# Patient Record
Sex: FEMALE | Race: WHITE | NOT HISPANIC OR LATINO | Employment: FULL TIME | URBAN - METROPOLITAN AREA
[De-identification: names, ages, dates, MRNs, and addresses within clinical notes are randomized per-mention and may not be internally consistent; named-entity substitution may affect disease eponyms.]

---

## 2023-04-27 PROCEDURE — 99222 1ST HOSP IP/OBS MODERATE 55: CPT | Performed by: INTERNAL MEDICINE

## 2023-04-28 PROCEDURE — 99232 SBSQ HOSP IP/OBS MODERATE 35: CPT | Performed by: INTERNAL MEDICINE

## 2023-04-29 PROCEDURE — 99233 SBSQ HOSP IP/OBS HIGH 50: CPT | Performed by: INTERNAL MEDICINE

## 2023-04-30 PROCEDURE — 99232 SBSQ HOSP IP/OBS MODERATE 35: CPT | Performed by: INTERNAL MEDICINE

## 2023-10-09 ENCOUNTER — OFFICE VISIT (OUTPATIENT)
Dept: WOUND CARE | Facility: HOSPITAL | Age: 33
End: 2023-10-09
Payer: COMMERCIAL

## 2023-10-09 PROCEDURE — 99213 OFFICE O/P EST LOW 20 MIN: CPT

## 2023-10-23 ENCOUNTER — OFFICE VISIT (OUTPATIENT)
Dept: WOUND CARE | Facility: HOSPITAL | Age: 33
End: 2023-10-23
Payer: COMMERCIAL

## 2023-10-23 PROCEDURE — 11042 DBRDMT SUBQ TIS 1ST 20SQCM/<: CPT

## 2023-11-06 ENCOUNTER — OFFICE VISIT (OUTPATIENT)
Dept: WOUND CARE | Facility: HOSPITAL | Age: 33
End: 2023-11-06
Payer: COMMERCIAL

## 2023-11-06 PROCEDURE — 11042 DBRDMT SUBQ TIS 1ST 20SQCM/<: CPT

## 2023-11-07 ENCOUNTER — APPOINTMENT (OUTPATIENT)
Dept: WOUND CARE | Facility: HOSPITAL | Age: 33
End: 2023-11-07
Payer: COMMERCIAL

## 2023-11-20 ENCOUNTER — OFFICE VISIT (OUTPATIENT)
Dept: WOUND CARE | Facility: HOSPITAL | Age: 33
End: 2023-11-20
Payer: COMMERCIAL

## 2023-11-20 PROCEDURE — 99213 OFFICE O/P EST LOW 20 MIN: CPT

## 2023-12-04 ENCOUNTER — OFFICE VISIT (OUTPATIENT)
Dept: WOUND CARE | Facility: HOSPITAL | Age: 33
End: 2023-12-04
Payer: COMMERCIAL

## 2023-12-04 PROCEDURE — 99214 OFFICE O/P EST MOD 30 MIN: CPT | Mod: 25

## 2023-12-18 ENCOUNTER — OFFICE VISIT (OUTPATIENT)
Dept: WOUND CARE | Facility: HOSPITAL | Age: 33
End: 2023-12-18
Payer: COMMERCIAL

## 2023-12-18 PROCEDURE — 99213 OFFICE O/P EST LOW 20 MIN: CPT | Mod: 25

## 2024-01-02 ENCOUNTER — OFFICE VISIT (OUTPATIENT)
Dept: WOUND CARE | Facility: HOSPITAL | Age: 34
End: 2024-01-02
Payer: COMMERCIAL

## 2024-01-02 PROCEDURE — 11042 DBRDMT SUBQ TIS 1ST 20SQCM/<: CPT

## 2024-01-15 ENCOUNTER — OFFICE VISIT (OUTPATIENT)
Dept: WOUND CARE | Facility: HOSPITAL | Age: 34
End: 2024-01-15
Payer: COMMERCIAL

## 2024-01-15 PROCEDURE — 11042 DBRDMT SUBQ TIS 1ST 20SQCM/<: CPT

## 2024-01-29 ENCOUNTER — OFFICE VISIT (OUTPATIENT)
Dept: WOUND CARE | Facility: HOSPITAL | Age: 34
End: 2024-01-29
Payer: COMMERCIAL

## 2024-01-29 PROCEDURE — 99214 OFFICE O/P EST MOD 30 MIN: CPT | Mod: 25

## 2024-02-12 ENCOUNTER — OFFICE VISIT (OUTPATIENT)
Dept: WOUND CARE | Facility: HOSPITAL | Age: 34
End: 2024-02-12
Payer: COMMERCIAL

## 2024-02-12 PROCEDURE — 99213 OFFICE O/P EST LOW 20 MIN: CPT | Mod: 25

## 2024-02-26 ENCOUNTER — OFFICE VISIT (OUTPATIENT)
Dept: WOUND CARE | Facility: HOSPITAL | Age: 34
End: 2024-02-26
Payer: COMMERCIAL

## 2024-02-26 PROCEDURE — 99213 OFFICE O/P EST LOW 20 MIN: CPT

## 2024-03-11 ENCOUNTER — OFFICE VISIT (OUTPATIENT)
Dept: WOUND CARE | Facility: HOSPITAL | Age: 34
End: 2024-03-11
Payer: COMMERCIAL

## 2024-03-11 PROCEDURE — 99213 OFFICE O/P EST LOW 20 MIN: CPT

## 2024-05-23 ENCOUNTER — OFFICE VISIT (OUTPATIENT)
Dept: WOUND CARE | Facility: HOSPITAL | Age: 34
End: 2024-05-23
Payer: COMMERCIAL

## 2024-05-23 DIAGNOSIS — L03.116 CELLULITIS OF LEFT FOOT: Primary | ICD-10-CM

## 2024-05-23 PROCEDURE — 99213 OFFICE O/P EST LOW 20 MIN: CPT | Mod: 25

## 2024-05-23 PROCEDURE — 87205 SMEAR GRAM STAIN: CPT | Mod: WESLAB | Performed by: PODIATRIST

## 2024-05-23 PROCEDURE — 11042 DBRDMT SUBQ TIS 1ST 20SQCM/<: CPT

## 2024-05-23 RX ORDER — DOXYCYCLINE 100 MG/1
100 TABLET ORAL 2 TIMES DAILY
Qty: 28 TABLET | Refills: 0 | Status: SHIPPED | OUTPATIENT
Start: 2024-05-23 | End: 2024-06-06

## 2024-05-26 LAB
BACTERIA SPEC CULT: NORMAL
GRAM STN SPEC: NORMAL
GRAM STN SPEC: NORMAL

## 2024-05-30 ENCOUNTER — OFFICE VISIT (OUTPATIENT)
Dept: WOUND CARE | Facility: HOSPITAL | Age: 34
End: 2024-05-30
Payer: COMMERCIAL

## 2024-05-30 PROCEDURE — 11042 DBRDMT SUBQ TIS 1ST 20SQCM/<: CPT

## 2024-06-06 ENCOUNTER — OFFICE VISIT (OUTPATIENT)
Dept: WOUND CARE | Facility: HOSPITAL | Age: 34
End: 2024-06-06
Payer: COMMERCIAL

## 2024-06-06 DIAGNOSIS — M05.572: ICD-10-CM

## 2024-06-06 DIAGNOSIS — M25.572 CHRONIC PAIN OF LEFT ANKLE: ICD-10-CM

## 2024-06-06 DIAGNOSIS — L53.9 REDNESS OF SKIN: ICD-10-CM

## 2024-06-06 DIAGNOSIS — L03.116 CELLULITIS OF LEFT FOOT: ICD-10-CM

## 2024-06-06 DIAGNOSIS — G89.29 CHRONIC PAIN OF LEFT ANKLE: ICD-10-CM

## 2024-06-06 DIAGNOSIS — L97.322 NON-PRESSURE CHRONIC ULCER OF LEFT ANKLE WITH FAT LAYER EXPOSED (MULTI): Primary | ICD-10-CM

## 2024-06-06 PROCEDURE — 11042 DBRDMT SUBQ TIS 1ST 20SQCM/<: CPT

## 2024-06-06 PROCEDURE — 87070 CULTURE OTHR SPECIMN AEROBIC: CPT | Mod: WESLAB | Performed by: PODIATRIST

## 2024-06-07 ENCOUNTER — HOSPITAL ENCOUNTER (OUTPATIENT)
Dept: RADIOLOGY | Facility: HOSPITAL | Age: 34
Discharge: HOME | End: 2024-06-07
Payer: COMMERCIAL

## 2024-06-07 DIAGNOSIS — L03.116 CELLULITIS OF LEFT FOOT: ICD-10-CM

## 2024-06-07 PROCEDURE — 73610 X-RAY EXAM OF ANKLE: CPT | Mod: LEFT SIDE | Performed by: RADIOLOGY

## 2024-06-07 PROCEDURE — 73610 X-RAY EXAM OF ANKLE: CPT | Mod: LT

## 2024-06-07 PROCEDURE — 73721 MRI JNT OF LWR EXTRE W/O DYE: CPT | Mod: LT

## 2024-06-07 PROCEDURE — 73721 MRI JNT OF LWR EXTRE W/O DYE: CPT | Mod: LEFT SIDE | Performed by: RADIOLOGY

## 2024-06-13 ENCOUNTER — OFFICE VISIT (OUTPATIENT)
Dept: WOUND CARE | Facility: HOSPITAL | Age: 34
End: 2024-06-13
Payer: COMMERCIAL

## 2024-06-13 PROCEDURE — 11042 DBRDMT SUBQ TIS 1ST 20SQCM/<: CPT

## 2024-06-14 LAB
BACTERIA SPEC CULT: ABNORMAL
GRAM STN SPEC: ABNORMAL
GRAM STN SPEC: ABNORMAL

## 2024-06-20 ENCOUNTER — OFFICE VISIT (OUTPATIENT)
Dept: WOUND CARE | Facility: HOSPITAL | Age: 34
End: 2024-06-20
Payer: COMMERCIAL

## 2024-06-20 PROCEDURE — 11042 DBRDMT SUBQ TIS 1ST 20SQCM/<: CPT

## 2024-06-27 ENCOUNTER — OFFICE VISIT (OUTPATIENT)
Dept: WOUND CARE | Facility: HOSPITAL | Age: 34
End: 2024-06-27
Payer: COMMERCIAL

## 2024-06-27 PROCEDURE — 11042 DBRDMT SUBQ TIS 1ST 20SQCM/<: CPT

## 2024-07-11 ENCOUNTER — OFFICE VISIT (OUTPATIENT)
Dept: WOUND CARE | Facility: HOSPITAL | Age: 34
End: 2024-07-11
Payer: COMMERCIAL

## 2024-07-11 PROCEDURE — 11042 DBRDMT SUBQ TIS 1ST 20SQCM/<: CPT

## 2024-07-18 ENCOUNTER — OFFICE VISIT (OUTPATIENT)
Dept: WOUND CARE | Facility: HOSPITAL | Age: 34
End: 2024-07-18
Payer: COMMERCIAL

## 2024-07-18 PROCEDURE — 11042 DBRDMT SUBQ TIS 1ST 20SQCM/<: CPT

## 2024-07-25 ENCOUNTER — OFFICE VISIT (OUTPATIENT)
Dept: WOUND CARE | Facility: HOSPITAL | Age: 34
End: 2024-07-25
Payer: COMMERCIAL

## 2024-07-25 PROCEDURE — 99213 OFFICE O/P EST LOW 20 MIN: CPT

## 2024-09-17 DIAGNOSIS — M19.072 ARTHRITIS OF LEFT ANKLE: Primary | ICD-10-CM

## 2024-10-09 ENCOUNTER — LAB (OUTPATIENT)
Dept: LAB | Facility: LAB | Age: 34
End: 2024-10-09
Payer: COMMERCIAL

## 2024-10-09 ENCOUNTER — PRE-ADMISSION TESTING (OUTPATIENT)
Dept: PREADMISSION TESTING | Facility: HOSPITAL | Age: 34
End: 2024-10-09
Payer: COMMERCIAL

## 2024-10-09 VITALS
OXYGEN SATURATION: 97 % | RESPIRATION RATE: 18 BRPM | HEIGHT: 70 IN | HEART RATE: 72 BPM | SYSTOLIC BLOOD PRESSURE: 124 MMHG | TEMPERATURE: 98.1 F | WEIGHT: 293 LBS | BODY MASS INDEX: 41.95 KG/M2 | DIASTOLIC BLOOD PRESSURE: 81 MMHG

## 2024-10-09 DIAGNOSIS — Z01.818 PRE-OP EVALUATION: Primary | ICD-10-CM

## 2024-10-09 DIAGNOSIS — Z01.818 PRE-OP EVALUATION: ICD-10-CM

## 2024-10-09 DIAGNOSIS — M19.072 ARTHRITIS OF LEFT ANKLE: ICD-10-CM

## 2024-10-09 LAB
ALBUMIN SERPL BCP-MCNC: 4.3 G/DL (ref 3.4–5)
ALP SERPL-CCNC: 124 U/L (ref 33–110)
ALT SERPL W P-5'-P-CCNC: 25 U/L (ref 7–45)
ANION GAP SERPL CALCULATED.3IONS-SCNC: 9 MMOL/L (ref 10–20)
AST SERPL W P-5'-P-CCNC: 25 U/L (ref 9–39)
BASOPHILS # BLD AUTO: 0 X10*3/UL (ref 0–0.1)
BASOPHILS NFR BLD AUTO: 0 %
BILIRUB SERPL-MCNC: 0.6 MG/DL (ref 0–1.2)
BUN SERPL-MCNC: 13 MG/DL (ref 6–23)
CALCIUM SERPL-MCNC: 9.3 MG/DL (ref 8.6–10.3)
CHLORIDE SERPL-SCNC: 104 MMOL/L (ref 98–107)
CO2 SERPL-SCNC: 29 MMOL/L (ref 21–32)
CREAT SERPL-MCNC: 0.66 MG/DL (ref 0.5–1.05)
EGFRCR SERPLBLD CKD-EPI 2021: >90 ML/MIN/1.73M*2
EOSINOPHIL # BLD AUTO: 0 X10*3/UL (ref 0–0.7)
EOSINOPHIL NFR BLD AUTO: 0 %
ERYTHROCYTE [DISTWIDTH] IN BLOOD BY AUTOMATED COUNT: 13 % (ref 11.5–14.5)
GLUCOSE SERPL-MCNC: 75 MG/DL (ref 74–99)
HCT VFR BLD AUTO: 37.3 % (ref 36–46)
HGB BLD-MCNC: 13 G/DL (ref 12–16)
IMM GRANULOCYTES # BLD AUTO: 0.01 X10*3/UL (ref 0–0.7)
IMM GRANULOCYTES NFR BLD AUTO: 0.2 % (ref 0–0.9)
LYMPHOCYTES # BLD AUTO: 1.96 X10*3/UL (ref 1.2–4.8)
LYMPHOCYTES NFR BLD AUTO: 37.5 %
MCH RBC QN AUTO: 29.7 PG (ref 26–34)
MCHC RBC AUTO-ENTMCNC: 34.9 G/DL (ref 32–36)
MCV RBC AUTO: 85 FL (ref 80–100)
MONOCYTES # BLD AUTO: 0.24 X10*3/UL (ref 0.1–1)
MONOCYTES NFR BLD AUTO: 4.6 %
NEUTROPHILS # BLD AUTO: 3.01 X10*3/UL (ref 1.2–7.7)
NEUTROPHILS NFR BLD AUTO: 57.7 %
NRBC BLD-RTO: 0 /100 WBCS (ref 0–0)
PLATELET # BLD AUTO: 187 X10*3/UL (ref 150–450)
POTASSIUM SERPL-SCNC: 3.9 MMOL/L (ref 3.5–5.3)
PROT SERPL-MCNC: 7.2 G/DL (ref 6.4–8.2)
RBC # BLD AUTO: 4.38 X10*6/UL (ref 4–5.2)
SODIUM SERPL-SCNC: 138 MMOL/L (ref 136–145)
WBC # BLD AUTO: 5.2 X10*3/UL (ref 4.4–11.3)

## 2024-10-09 PROCEDURE — 36415 COLL VENOUS BLD VENIPUNCTURE: CPT

## 2024-10-09 PROCEDURE — 80053 COMPREHEN METABOLIC PANEL: CPT

## 2024-10-09 PROCEDURE — 93010 ELECTROCARDIOGRAM REPORT: CPT | Performed by: INTERNAL MEDICINE

## 2024-10-09 PROCEDURE — 93005 ELECTROCARDIOGRAM TRACING: CPT

## 2024-10-09 PROCEDURE — 85025 COMPLETE CBC W/AUTO DIFF WBC: CPT

## 2024-10-09 RX ORDER — UBIDECARENONE 75 MG
500 CAPSULE ORAL
COMMUNITY

## 2024-10-09 RX ORDER — CHLORHEXIDINE GLUCONATE ORAL RINSE 1.2 MG/ML
SOLUTION DENTAL
Qty: 473 ML | Refills: 0 | Status: SHIPPED | OUTPATIENT
Start: 2024-10-09 | End: 2024-10-23

## 2024-10-09 RX ORDER — SERTRALINE HYDROCHLORIDE 100 MG/1
100 TABLET, FILM COATED ORAL DAILY
COMMUNITY

## 2024-10-09 RX ORDER — ACETAMINOPHEN 500 MG
1000 TABLET ORAL EVERY 6 HOURS PRN
COMMUNITY

## 2024-10-09 RX ORDER — TRAZODONE HYDROCHLORIDE 100 MG/1
100 TABLET ORAL
COMMUNITY

## 2024-10-09 RX ORDER — CETIRIZINE HYDROCHLORIDE 10 MG/1
10 TABLET ORAL
COMMUNITY

## 2024-10-09 RX ORDER — ETANERCEPT 50 MG/ML
50 SOLUTION SUBCUTANEOUS WEEKLY
COMMUNITY
Start: 2024-01-11 | End: 2025-01-10

## 2024-10-09 RX ORDER — ZINC GLUCONATE 50 MG
500 TABLET ORAL
COMMUNITY

## 2024-10-09 RX ORDER — FOLIC ACID 1 MG/1
1 TABLET ORAL DAILY
COMMUNITY

## 2024-10-09 ASSESSMENT — DUKE ACTIVITY SCORE INDEX (DASI)
CAN YOU WALK A BLOCK OR TWO ON LEVEL GROUND: YES
CAN YOU PARTICIPATE IN MODERATE RECREATIONAL ACTIVITIES LIKE GOLF, BOWLING, DANCING, DOUBLES TENNIS OR THROWING A BASEBALL OR FOOTBALL: NO
CAN YOU WALK INDOORS, SUCH AS AROUND YOUR HOUSE: YES
TOTAL_SCORE: 28.7
CAN YOU DO YARD WORK LIKE RAKING LEAVES, WEEDING OR PUSHING A MOWER: YES
CAN YOU RUN A SHORT DISTANCE: NO
CAN YOU DO HEAVY WORK AROUND THE HOUSE LIKE SCRUBBING FLOORS OR LIFTING AND MOVING HEAVY FURNITURE: NO
CAN YOU CLIMB A FLIGHT OF STAIRS OR WALK UP A HILL: YES
DASI METS SCORE: 6.3
CAN YOU PARTICIPATE IN STRENOUS SPORTS LIKE SWIMMING, SINGLES TENNIS, FOOTBALL, BASKETBALL, OR SKIING: NO
CAN YOU TAKE CARE OF YOURSELF (EAT, DRESS, BATHE, OR USE TOILET): YES
CAN YOU DO MODERATE WORK AROUND THE HOUSE LIKE VACUUMING, SWEEPING FLOORS OR CARRYING GROCERIES: YES
CAN YOU DO LIGHT WORK AROUND THE HOUSE LIKE DUSTING OR WASHING DISHES: YES
CAN YOU HAVE SEXUAL RELATIONS: YES

## 2024-10-09 ASSESSMENT — ENCOUNTER SYMPTOMS
ALLERGIC/IMMUNOLOGIC NEGATIVE: 1
CONSTITUTIONAL NEGATIVE: 1
EYES NEGATIVE: 1
JOINT SWELLING: 1
ENDOCRINE NEGATIVE: 1
GASTROINTESTINAL NEGATIVE: 1
RESPIRATORY NEGATIVE: 1
HEMATOLOGIC/LYMPHATIC NEGATIVE: 1
PSYCHIATRIC NEGATIVE: 1

## 2024-10-09 ASSESSMENT — PAIN DESCRIPTION - DESCRIPTORS: DESCRIPTORS: ACHING;TENDER;THROBBING

## 2024-10-09 ASSESSMENT — PAIN SCALES - GENERAL: PAINLEVEL_OUTOF10: 7

## 2024-10-09 ASSESSMENT — PAIN - FUNCTIONAL ASSESSMENT: PAIN_FUNCTIONAL_ASSESSMENT: 0-10

## 2024-10-09 NOTE — CPM/PAT H&P
CPM/PAT Evaluation       Name: Raine Rothman (Raine Rothman)  /Age: 1990/34 y.o.     In-Person       Chief Complaint: Left ankle pain    HPI: Raine Rothman is a 34 year old female with history of Factor 5 Leiden mutation and RA. She has complaints of ongoing left ankle pain. She states she injured her ankle back in . That same year she had ankle stabilization surgery followed by an arthroscopy repair in . She said last year she developed a wound on the left ankle and was admitted for sepsis. She states today the wound is healed but she is still having constant ankle pain in the joints. She needs a cane to help ambulate. She states she has decreased ROM in the left ankle. She has tried steroid injections with no improvement. She denies fever, chills, nausea, vomiting, chest pain, sob, dizziness, and palpitations.    Past Medical History:   Diagnosis Date    Anxiety May 2022    On zoloft 100mg    Arthritis 2009    Rheumatoid arthritis; enbrel injections 50mg weekly    Autoimmune disorder (Multi)     Rheumatoid arthritis    Chronic pain disorder     Rheumatoid arthritis    Depression May 2022    On zoloft 100mg    Factor V Leiden (Multi)     Not on routine meds; usually get placed on lovenox after procedures    Gestational diabetes 2021    Resolved after birth    Immunocompromised     Rheumatoid arthritis    Intellectual disability May 2022    On zoloft 100mg daily    Joint pain     Rheumatoid arthritis    Obesity Unsure    Thrombocytopenia (CMS-HCC) May 2021    Resolved; was pregnant       Past Surgical History:   Procedure Laterality Date    ADENOIDECTOMY  At 2 years old     SECTION, LOW TRANSVERSE  2021    COLPOSCOPY  At 22 years old    Came back fine    OTHER SURGICAL HISTORY  2009 and 2009; L ankle stabilization 2016; L ankle arthroscopy    TONSILLECTOMY  2 years old    TYMPANOPLASTY  2 years old     Social History     Tobacco Use    Smoking  status: Former     Current packs/day: 0.00     Types: Cigarettes     Quit date: 2015     Years since quittin.1    Smokeless tobacco: Never    Tobacco comments:     Former smoker socially 1 pack every couple weeks   Substance Use Topics    Alcohol use: Yes     Alcohol/week: 1.0 standard drink of alcohol     Types: 1 Standard drinks or equivalent per week     Comment: Occasionally on holidays/special occasions     Social History     Substance and Sexual Activity   Drug Use Never       Patient  reports being sexually active and has had partner(s) who are male. She reports using the following method of birth control/protection: Condom Male.    Family History   Problem Relation Name Age of Onset    Hypertension Mother Angi Ruiz     Cancer Father Tiago ruiz sr-small cell lung cancer     Arthritis Maternal Grandfather Torres hammer     Stroke Maternal Grandmother Liliana hammer     Depression Brother Tiago Ruiz        Allergies   Allergen Reactions    Infliximab Anaphylaxis    Cefaclor Itching, Rash and Other    Piperacillin-Tazobactam Itching and Rash    Sulfa (Sulfonamide Antibiotics) Rash     Current Outpatient Medications   Medication Sig Dispense Refill    acetaminophen (Tylenol) 500 mg tablet Take 2 tablets (1,000 mg) by mouth every 6 hours if needed.      cetirizine (ZyrTEC) 10 mg tablet Take 1 tablet (10 mg) by mouth once daily.      cyanocobalamin (Vitamin B-12) 500 mcg tablet Take 1 tablet (500 mcg) by mouth once daily.      EnbreL SureClick 50 mg/mL (1 mL) pen injector pen injection Inject 1 mL (50 mg) under the skin once a week.      folic acid (Folvite) 1 mg tablet Take 1 tablet (1 mg) by mouth once daily.      magnesium oxide 500 mg capsule Take 1 capsule (500 mg) by mouth once daily.      sertraline (Zoloft) 100 mg tablet Take 1 tablet (100 mg) by mouth once daily.      traZODone (Desyrel) 100 mg tablet Take 1 tablet (100 mg) by mouth daily at bedtime.      chlorhexidine (Peridex) 0.12 %  solution Use as directed 473 mL 0     No current facility-administered medications for this visit.       Review of Systems   Constitutional: Negative.    HENT: Negative.     Eyes: Negative.    Respiratory: Negative.     Cardiovascular:  Positive for leg swelling (left leg).   Gastrointestinal: Negative.    Endocrine: Negative.    Genitourinary: Negative.    Musculoskeletal:  Positive for gait problem and joint swelling.        Left ankle pain     Skin: Negative.    Allergic/Immunologic: Negative.    Hematological: Negative.    Psychiatric/Behavioral: Negative.                Physical Exam  Vitals reviewed.   Constitutional:       Appearance: Normal appearance.   HENT:      Head: Normocephalic and atraumatic.      Nose: Nose normal.      Mouth/Throat:      Mouth: Mucous membranes are moist.      Pharynx: Oropharynx is clear.   Eyes:      Extraocular Movements: Extraocular movements intact.      Conjunctiva/sclera: Conjunctivae normal.      Pupils: Pupils are equal, round, and reactive to light.   Cardiovascular:      Rate and Rhythm: Normal rate and regular rhythm.      Pulses: Normal pulses.      Heart sounds: Normal heart sounds.   Pulmonary:      Effort: Pulmonary effort is normal.      Breath sounds: Normal breath sounds.   Abdominal:      General: Bowel sounds are normal.      Palpations: Abdomen is soft.   Genitourinary:     Comments: Assessment deferred to physician    Musculoskeletal:      Cervical back: Normal range of motion and neck supple.      Left lower leg: Edema present.      Comments: Patient has left ankle in a brace     Skin:     General: Skin is warm and dry.   Neurological:      General: No focal deficit present.      Mental Status: She is alert and oriented to person, place, and time.   Psychiatric:         Mood and Affect: Mood normal.         Behavior: Behavior normal.         Thought Content: Thought content normal.         Judgment: Judgment normal.          PAT AIRWAY:   Airway:      "Mallampati::  I    TM distance::  >3 FB    Neck ROM::  Full  normal      /81   Pulse 72   Temp 36.7 °C (98.1 °F) (Temporal)   Resp 18   Ht 1.778 m (5' 10\")   Wt 142 kg (314 lb)   LMP 09/25/2024 (Exact Date)   SpO2 97%   BMI 45.05 kg/m²     ASA: II  KELLEE:1.9%  RCRI: 0.4%      DASI Risk Score      Flowsheet Row Pre-Admission Testing from 10/9/2024 in [facility] Questionnaire Series Submission from 8/26/2024 in [facility] with Generic Provider Kat   Can you take care of yourself (eat, dress, bathe, or use toilet)?  2.75 filed at 10/09/2024 1524 2.75  filed at 08/26/2024 1150   Can you walk indoors, such as around your house? 1.75 filed at 10/09/2024 1524 1.75  filed at 08/26/2024 1150   Can you walk a block or two on level ground?  2.75 filed at 10/09/2024 1524 2.75  filed at 08/26/2024 1150   Can you climb a flight of stairs or walk up a hill? 5.5 filed at 10/09/2024 1524 5.5  filed at 08/26/2024 1150   Can you run a short distance? 0 filed at 10/09/2024 1524 0  filed at 08/26/2024 1150   Can you do light work around the house like dusting or washing dishes? 2.7 filed at 10/09/2024 1524 2.7  filed at 08/26/2024 1150   Can you do moderate work around the house like vacuuming, sweeping floors or carrying groceries? 3.5 filed at 10/09/2024 1524 3.5  filed at 08/26/2024 1150   Can you do heavy work around the house like scrubbing floors or lifting and moving heavy furniture?  0 filed at 10/09/2024 1524 0  filed at 08/26/2024 1150   Can you do yard work like raking leaves, weeding or pushing a mower? 4.5 filed at 10/09/2024 1524 4.5  filed at 08/26/2024 1150   Can you have sexual relations? 5.25 filed at 10/09/2024 1524 5.25  filed at 08/26/2024 1150   Can you participate in moderate recreational activities like golf, bowling, dancing, doubles tennis or throwing a baseball or football? 0 filed at 10/09/2024 1524 0  filed at 08/26/2024 1150   Can you participate in strenous sports like " swimming, singles tennis, football, basketball, or skiing? 0 filed at 10/09/2024 1524 0  filed at 08/26/2024 1150   DASI SCORE 28.7 filed at 10/09/2024 1524 28.7  filed at 08/26/2024 1150   METS Score (Will be calculated only when all the questions are answered) 6.3 filed at 10/09/2024 1524 6.3  filed at 08/26/2024 1150          Caprini DVT Assessment    No data to display       Modified Frailty Index    No data to display       CHADS2 Stroke Risk  Current as of 14 minutes ago        N/A 3 to 100%: High Risk   2 to < 3%: Medium Risk   0 to < 2%: Low Risk     Last Change: N/A          This score determines the patient's risk of having a stroke if the patient has atrial fibrillation.        This score is not applicable to this patient. Components are not calculated.          Revised Cardiac Risk Index      Flowsheet Row Pre-Admission Testing from 10/9/2024 in [facility]   High-Risk Surgery (Intraperitoneal, Intrathoracic,Suprainguinal vascular) 0 filed at 10/09/2024 1608   History of ischemic heart disease (History of MI, History of positive exercuse test, Current chest paint considered due to myocardial ischemia, Use of nitrate therapy, ECG with pathological Q Waves) 0 filed at 10/09/2024 1608   History of congestive heart failure (pulmonary edemia, bilateral rales or S3 gallop, Paroxysmal nocturnal dyspnea, CXR showing pulmonary vascular redistribution) 0 filed at 10/09/2024 1608   History of cerebrovascular disease (Prior TIA or stroke) 0 filed at 10/09/2024 1608   Pre-operative insulin treatment 0 filed at 10/09/2024 1608   Pre-operative creatinine>2 mg/dl 0 filed at 10/09/2024 1608   Revised Cardiac Risk Calculator 0 filed at 10/09/2024 1608          Apfel Simplified Score    No data to display       Risk Analysis Index Results This Encounter    No data found in the last 10 encounters.       Stop Bang Score      Flowsheet Row Pre-Admission Testing from 10/9/2024 in [facility]  Questionnaire Series Submission from 8/26/2024 in [facility] with Generic Provider Kat   Do you snore loudly? 0 filed at 10/09/2024 1524 0  filed at 08/26/2024 1150   Do you often feel tired or fatigued after your sleep? 1 filed at 10/09/2024 1524 1  filed at 08/26/2024 1150   Has anyone ever observed you stop breathing in your sleep? 0 filed at 10/09/2024 1524 0  filed at 08/26/2024 1150   Do you have or are you being treated for high blood pressure? 0 filed at 10/09/2024 1524 0  filed at 08/26/2024 1150   Recent BMI (Calculated) 45.1 filed at 10/09/2024 1524 49.5  filed at 08/26/2024 1150   Is BMI greater than 35 kg/m2? 1=Yes filed at 10/09/2024 1524 1=Yes  filed at 08/26/2024 1150   Age older than 50 years old? 0=No filed at 10/09/2024 1524 0=No  filed at 08/26/2024 1150   Is your neck circumference greater than 17 inches (Male) or 16 inches (Female)? 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 Patient states she will take Lovenox after this procedure.  RA: Enbrel  BMI: 45.05    LABS: CBC, CMP, MRSA collected in PAT  EKG completed in PAT    Anna Hinojosa APRN-CNP

## 2024-10-09 NOTE — PREPROCEDURE INSTRUCTIONS
Medication List            Accurate as of October 9, 2024  3:26 PM. Always use your most recent med list.                acetaminophen 500 mg tablet  Commonly known as: Tylenol  Medication Adjustments for Surgery: Take/Use as prescribed     cetirizine 10 mg tablet  Commonly known as: ZyrTEC  Medication Adjustments for Surgery: Do Not take on the morning of surgery     cyanocobalamin 500 mcg tablet  Commonly known as: Vitamin B-12  Additional Medication Adjustments for Surgery: Take last dose 7 days before surgery     EnbreL SureClick 50 mg/mL (1 mL) pen injector pen injection  Generic drug: etanercept  Additional Medication Adjustments for Surgery: Other (Comment)  Notes to patient: Instructed to stop 2 weeks before surgery and resume 4 weeks after surgery per rheumatologist     folic acid 1 mg tablet  Commonly known as: Folvite  Additional Medication Adjustments for Surgery: Take last dose 7 days before surgery     magnesium oxide 500 mg capsule  Additional Medication Adjustments for Surgery: Take last dose 7 days before surgery     sertraline 100 mg tablet  Commonly known as: Zoloft  Medication Adjustments for Surgery: Take on the morning of surgery     traZODone 100 mg tablet  Commonly known as: Desyrel  Medication Adjustments for Surgery: Take/Use as prescribed              Preoperative Fasting Guidelines    Why must I stop eating and drinking near surgery time?  With sedation, food or liquid in your stomach can enter your lungs causing serious complications  Increases nausea and vomiting    When do I need to stop eating and drinking before my surgery?  Do not eat any food or drink any liquids after midnight the night before your surgery/procedure.  You may have sips of water to take medications.    PAT DISCHARGE INSTRUCTIONS    Please call the Same Day Surgery (SDS) Department of the hospital where your procedure will be performed after 2:00 PM the day before your surgery. If you are scheduled on a Monday,  or a Tuesday following a Monday holiday, you will need to call on the last business day prior to your surgery.    [facility]  [address] [place] [address] [phone]  [facility]  [address] [address], [id number] [phone]  [facility]  [address]  [place] [id number]  [phone]    Please let your surgeon know if:      You develop any open sores, shingles, burning or painful urination as these may increase your risk of an infection.   You no longer wish to have the surgery.   Any other personal circumstances change that may lead to the need to cancel or defer this surgery-such as being sick or getting admitted to any hospital within one week of your planned procedure.    Your contact details change, such as a change of address or phone number.    Starting now:     Please DO NOT drink alcohol or smoke for 24 hours before surgery. It is well known that quitting smoking can make a huge difference to your health and recovery from surgery. The longer you abstain from smoking, the better your chances of a healthy recovery. If you need help with quitting, call 5-681-QUIT-NOW to be connected to a trained counselor who will discuss the best methods to help you quit.     Before your surgery:    Please stop all supplements 7 days prior to surgery. Or as directed by your surgeon.   Please stop taking NSAID pain medicine such as Advil and Motrin 7 days before surgery.    If you develop any fever, cough, cold, rashes, cuts, scratches, scrapes, urinary symptoms or infection anywhere on your body (including teeth and gums) prior to surgery, please call your surgeon’s office as soon as possible. This may require treatment to reduce the chance of cancellation on the day of surgery.    The day before your surgery:   DIET- Please follow the diet instructions at the  top of your packet.   Get a good night’s rest.  Use the special soap for bathing if you have been instructed to use one.    Scheduled surgery times may change and you will be notified if this occurs - please check your personal voicemail for any updates.     On the morning of surgery:   Wear comfortable, loose fitting clothes which open in the front. Please do not wear moisturizers, creams, lotions, makeup or perfume.    Please bring with you to surgery:   Photo ID and insurance card   Current list of medicines and allergies   Pacemaker/ Defibrillator/Heart stent cards   CPAP machine and mask    Slings/ splints/ crutches   A copy of your complete advanced directive/DHPOA.    Please do NOT bring with you to surgery:   All jewelry and valuables should be left at home.   Prosthetic devices such as contact lenses, hearing aids, dentures, eyelash extensions, hairpins and body piercings must be removed prior to going in to the surgical suite.    After outpatient surgery:   A responsible adult MUST accompany you at the time of discharge and stay with you for 24 hours after your surgery. You may NOT drive yourself home after surgery.    Do not drive, operate machinery, make critical decisions or do activities that require co-ordination or balance until after a night’s sleep.   Do not drink alcoholic beverages for 24 hours.   Instructions for resuming your medications will be provided by your surgeon.    CALL YOUR DOCTOR AFTER SURGERY IF YOU HAVE:     Chills and/or a fever of 101° F or higher.    Redness, swelling, pus or drainage from your surgical wound or a bad smell from the wound.    Lightheadedness, fainting or confusion.    Persistent vomiting (throwing up) and are not able to eat or drink for 12 hours.    Three or more loose, watery bowel movements in 24 hours (diarrhea).   Difficulty or pain while urinating( after non-urological surgery)    Pain and swelling in your legs, especially if it is only on one side.     Difficulty breathing or are breathing faster than normal.    Any new concerning symptoms.      Patient Information: Pre-Operative Infection Prevention Measures     Why did I have my nose, under my arms, and groin swabbed?  The purpose of the swab is to identify Staphylococcus aureus inside your nose or on your skin.  The swab was sent to the laboratory for culture.  A positive swab/culture for Staphylococcus aureus is called colonization or carriage.      What is Staphylococcus aureus?  Staphylococcus aureus, also known as “staph”, is a germ found on the skin or in the nose of healthy people.  Sometimes Staphylococcus aureus can get into the body and cause an infection.  This can be minor (such as pimples, boils, or other skin problems).  It might also be serious (such as a blood infection, pneumonia, or a surgical site infection).    What is Staphylococcus aureus colonization or carriage?  Colonization or carriage means that a person has the germ but is not sick from it.  These bacteria can be spread on the hands or when breathing or sneezing.    How is Staphylococcus aureus spread?  It is most often spread by close contact with a person or item that carries it.    What happens if my culture is positive for Staphylococcus aureus?  Your doctor/medical team will use this information to guide any antibiotic treatment which may be necessary.  Regardless of the culture results, we will clean the inside of your nose with a betadine swab just before you have your surgery.      Will I get an infection if I have Staphylococcus aureus in my nose or on my skin?  Anyone can get an infection with Staphylococcus aureus.  However, the best way to reduce your risk of infection is to follow the instructions provided to you for the use of your CHG soap and dental rinse.        Patient Information: Oral/Dental Rinse    What is oral/dental rinse?   It is a mouthwash. It is a way of cleaning the mouth with a germ-killing solution  before your surgery.  The solution contains chlorhexidine, commonly known as CHG.   It is used inside the mouth to kill a bacteria known as Staphylococcus aureus.  Let your doctor know if you are allergic to Chlorhexidine.    Why do I need to use CHG oral/dental rinse?  The CHG oral/dental rinse helps to kill a bacteria in your mouth known as Staphylococcus aureus.     This reduces the risk of infection at the surgical site.      Using your CHG oral/dental rinse  STEPS:  Use your CHG oral/dental rinse after you brush your teeth the night before (at bedtime) and the morning of your surgery.  Follow all directions on your prescription label.    Use the cap on the container to measure 15ml   Swish (gargle if you can) the mouthwash in your mouth for at least 30 seconds, (do not swallow) and spit out  After you use your CHG rinse, do not rinse your mouth with water, drink or eat.  Please refer to the prescription label for the appropriate time to resume oral intake      What side effects might I have using the CHG oral/dental rinse?  CHG rinse will stick to plaque on the teeth.  Brush and floss just before use.  Teeth brushing will help avoid staining of plaque during use.      Patient Information: Home Preoperative Antibacterial Shower      What is a home preoperative antibacterial shower?  This shower is a way of cleaning the skin with a germ-killing solution before surgery.  The solution contains chlorhexidine, commonly known as CHG.  CHG is a skin cleanser with germ-killing ability.  Let your doctor know if you are allergic to chlorhexidine.    Why do I need to take a preoperative antibacterial shower?  Skin is not sterile.  It is best to try to make your skin as free of germs as possible before surgery.  Proper cleansing with a germ-killing soap before surgery can lower the number of germs on your skin.  This helps to reduce the risk of infection at the surgical site.  Following the instructions listed below will  help you prepare your skin for surgery.      How do I use the solution?  Steps:  Begin using your CHG soap 5 days before your scheduled surgery on ___start wash 10/19/24_______.    First, wash and rinse your hair using the CHG soap. Keep CHG soap away from ear canals and eyes.  Rinse completely, do not condition.  Hair extensions should be removed.  Wash your face with your normal soap and rinse.    Apply the CHG solution to a clean wet washcloth.  Turn the water off or move away from the water spray to avoid premature rinsing of the CHG soap as you are applying.   Firmly lather your entire body from the neck down.  Do not use on your face.  Pay special attention to the area(s) where your incision(s) will be located unless they are on your face.  Avoid scrubbing your skin too hard.  The important point is to have the CHG soap sit on your skin for 3 minutes.    When the 3 minutes are up, turn on the water and rinse the CHG solution off your body completely.   DO NOT wash with regular soap after you have used the CHG soap solution  Pat yourself dry with a clean, freshly-laundered towel.  DO NOT apply powders, deodorants, or lotions.  Dress in clean, freshly laundered nightclothes.    Be sure to sleep with clean, freshly laundered sheets.  Be aware that CHG will cause stains on fabrics; if you wash them with bleach after use.  Rinse your washcloth and other linens that have contact with CHG completely.  Use only non-chlorine detergents to launder the items used.   The morning of surgery is the fifth day.  Repeat the above steps and dress in clean comfortable clothing     Whom should I contact if I have any questions regarding the use of CHG soap?  Call the [facility] at [phone] if you have any questions.

## 2024-10-12 LAB
ATRIAL RATE: 70 BPM
P AXIS: 49 DEGREES
P OFFSET: 190 MS
P ONSET: 135 MS
PR INTERVAL: 164 MS
Q ONSET: 217 MS
QRS COUNT: 12 BEATS
QRS DURATION: 98 MS
QT INTERVAL: 412 MS
QTC CALCULATION(BAZETT): 444 MS
QTC FREDERICIA: 433 MS
R AXIS: 58 DEGREES
T AXIS: 31 DEGREES
T OFFSET: 423 MS
VENTRICULAR RATE: 70 BPM

## 2024-10-14 NOTE — CPM/PAT H&P
CPM/PAT Evaluation       Name: Raine Rothman (Raine Rothman)  /Age: 1990/34 y.o.     Raine Rothman saw APRN in PAT on 10/9/24 in advance of her recession of gastrocnemius, excision bone foot with bone graft, and arthrodesis L ankle  with Dr Dominguez.    In following up with her confirmed EKG of 10/9/24 she has a septal infarct present.    Past Medical History:   Diagnosis Date    Anxiety May 2022    On zoloft 100mg    Arthritis     Rheumatoid arthritis; enbrel injections 50mg weekly    Autoimmune disorder (Multi)     Rheumatoid arthritis    Chronic pain disorder     Rheumatoid arthritis    Depression May 2022    On zoloft 100mg    Factor V Leiden (Multi)     Not on routine meds; usually get placed on lovenox after procedures    Gestational diabetes 2021    Resolved after birth    Immunocompromised     Rheumatoid arthritis    Intellectual disability May 2022    On zoloft 100mg daily    Joint pain     Rheumatoid arthritis    Obesity Unsure    Thrombocytopenia (CMS-HCC) May 2021    Resolved; was pregnant         Secure Messaging  10/14/24 To Jenny Osborne, and Nidhi Desai     Hi Dr Dominguez, Ms. Rothman was seen in PAT 10/9/24 in advance of her Recession Gastrocnemius on 10/23, In following up on her EKG the cardiologist read it as NSR but septal infarct is now present. She will need to see Cardiology for evaluation preop, I will call your office as well to let them know. Thanks. Nidhi Angulo, APRCHESTER-CNP

## 2024-10-15 ENCOUNTER — APPOINTMENT (OUTPATIENT)
Age: 34
End: 2024-10-15
Payer: COMMERCIAL

## 2024-10-15 ENCOUNTER — OFFICE VISIT (OUTPATIENT)
Dept: CARDIOLOGY | Facility: CLINIC | Age: 34
End: 2024-10-15
Payer: COMMERCIAL

## 2024-10-15 VITALS
OXYGEN SATURATION: 99 % | HEIGHT: 70 IN | BODY MASS INDEX: 41.95 KG/M2 | HEART RATE: 87 BPM | WEIGHT: 293 LBS | DIASTOLIC BLOOD PRESSURE: 93 MMHG | SYSTOLIC BLOOD PRESSURE: 139 MMHG

## 2024-10-15 DIAGNOSIS — R94.31 ABNORMAL EKG: ICD-10-CM

## 2024-10-15 DIAGNOSIS — Z01.818 PRE-OP EVALUATION: ICD-10-CM

## 2024-10-15 PROCEDURE — 99204 OFFICE O/P NEW MOD 45 MIN: CPT | Performed by: NURSE PRACTITIONER

## 2024-10-15 PROCEDURE — 3008F BODY MASS INDEX DOCD: CPT | Performed by: NURSE PRACTITIONER

## 2024-10-15 PROCEDURE — 99214 OFFICE O/P EST MOD 30 MIN: CPT | Performed by: NURSE PRACTITIONER

## 2024-10-15 PROCEDURE — 1036F TOBACCO NON-USER: CPT | Performed by: NURSE PRACTITIONER

## 2024-10-15 ASSESSMENT — COLUMBIA-SUICIDE SEVERITY RATING SCALE - C-SSRS
2. HAVE YOU ACTUALLY HAD ANY THOUGHTS OF KILLING YOURSELF?: NO
6. HAVE YOU EVER DONE ANYTHING, STARTED TO DO ANYTHING, OR PREPARED TO DO ANYTHING TO END YOUR LIFE?: NO
1. IN THE PAST MONTH, HAVE YOU WISHED YOU WERE DEAD OR WISHED YOU COULD GO TO SLEEP AND NOT WAKE UP?: NO

## 2024-10-15 ASSESSMENT — ENCOUNTER SYMPTOMS
RESPIRATORY NEGATIVE: 1
CARDIOVASCULAR NEGATIVE: 1
CONSTITUTIONAL NEGATIVE: 1
MUSCULOSKELETAL NEGATIVE: 1
NEUROLOGICAL NEGATIVE: 1
GASTROINTESTINAL NEGATIVE: 1

## 2024-10-15 ASSESSMENT — PATIENT HEALTH QUESTIONNAIRE - PHQ9
2. FEELING DOWN, DEPRESSED OR HOPELESS: NOT AT ALL
1. LITTLE INTEREST OR PLEASURE IN DOING THINGS: NOT AT ALL
SUM OF ALL RESPONSES TO PHQ9 QUESTIONS 1 AND 2: 0

## 2024-10-15 ASSESSMENT — PAIN SCALES - GENERAL: PAINLEVEL: 0-NO PAIN

## 2024-10-15 NOTE — PROGRESS NOTES
"Chief Complaint:   Pre-op Clearance    History Of Present Illness:    .Ms Rothman is here for cardiac clearance for an ankle surgery.  She had an ecg showing septal infarct and then one showing NSR without infarct.  The PAT department told her she needed cardiac clearance, however while in the office for her visit, they called and stated they found the repeat ecg and she no longer needs clearance.  She denies chest pain, sob, palpitations or pedal edema.  Denies CAD, DM, hypertension, hyperlipidemia and is a former smoker who quit in .  She has a family history of her maternal grandfather having had fatal MI.         Last Recorded Vitals:  Blood pressure (!) 139/93, pulse 87, height 1.778 m (5' 10\"), weight 141 kg (311 lb), last menstrual period 2024, SpO2 99%.     Past Medical History:  Past Medical History:   Diagnosis Date    Anxiety May 2022    On zoloft 100mg    Arthritis     Rheumatoid arthritis; enbrel injections 50mg weekly    Autoimmune disorder (Multi)     Rheumatoid arthritis    Chronic pain disorder     Rheumatoid arthritis    Depression May 2022    On zoloft 100mg    Factor V Leiden (Multi)     Not on routine meds; usually get placed on lovenox after procedures    Gestational diabetes 2021    Resolved after birth    Immunocompromised     Rheumatoid arthritis    Intellectual disability May 2022    On zoloft 100mg daily    Joint pain     Rheumatoid arthritis    Obesity Unsure    Thrombocytopenia (CMS-HCC) May 2021    Resolved; was pregnant        Past Surgical History:  Past Surgical History:   Procedure Laterality Date    ADENOIDECTOMY  At 2 years old     SECTION, LOW TRANSVERSE  2021    COLPOSCOPY  At 22 years old    Came back fine    OTHER SURGICAL HISTORY  2009 and 2009; L ankle stabilization 2016; L ankle arthroscopy    TONSILLECTOMY  2 years old    TYMPANOPLASTY  2 years old       Social History:  Social History "     Socioeconomic History    Marital status:    Tobacco Use    Smoking status: Former     Current packs/day: 0.00     Types: Cigarettes     Quit date: 2015     Years since quittin.2    Smokeless tobacco: Never    Tobacco comments:     Former smoker socially 1 pack every couple weeks   Vaping Use    Vaping status: Never Used   Substance and Sexual Activity    Alcohol use: Yes     Alcohol/week: 1.0 standard drink of alcohol     Types: 1 Standard drinks or equivalent per week     Comment: Occasionally on holidays/special occasions    Drug use: Never    Sexual activity: Yes     Partners: Male     Birth control/protection: Condom Male     Social Determinants of Health     Financial Resource Strain: Medium Risk (2024)    Received from [facility]    Overall Financial Resource Strain (CARDIA)     Difficulty of Paying Living Expenses: Somewhat hard   Food Insecurity: No Food Insecurity (2024)    Received from [facility]    Hunger Vital Sign     Worried About Running Out of Food in the Last Year: Never true     Ran Out of Food in the Last Year: Never true   Transportation Needs: No Transportation Needs (2024)    Received from [facility]    PRAPARE - Transportation     Lack of Transportation (Medical): No     Lack of Transportation (Non-Medical): No   Physical Activity: Insufficiently Active (2024)    Received from [facility]    Exercise Vital Sign     Days of Exercise per Week: 2 days     Minutes of Exercise per Session: 30 min   Stress: Stress Concern Present (2024)    Received from [facility]    [place] [employer] - Occupational Stress Questionnaire     Feeling of Stress : To some extent   Social Connections: Socially Integrated (2024)    Received from [facility]    Social Connection and Isolation Panel [NHANES]     Frequency of Communication with Friends and Family: More than three times a week     Frequency of Social  Gatherings with Friends and Family: Once a week     Attends Oriental orthodox Services: 1 to 4 times per year     Active Member of Clubs or Organizations: No     Attends Club or Organization Meetings: 1 to 4 times per year     Marital Status:    Housing Stability: Unknown (8/28/2024)    Received from [facility]    Housing Stability Vital Sign     Unable to Pay for Housing in the Last Year: No       Family History:  Family History   Problem Relation Name Age of Onset    Hypertension Mother Angi Ruiz     Cancer Father Tiago ruiz sr-small cell lung cancer     Arthritis Maternal Grandfather Torres hammer     Stroke Maternal Grandmother Liliana hammer     Depression Brother Tiago Ruiz          Allergies:  Infliximab, Cefaclor, Piperacillin-tazobactam, and Sulfa (sulfonamide antibiotics)    Outpatient Medications:  Current Outpatient Medications   Medication Sig Dispense Refill    acetaminophen (Tylenol) 500 mg tablet Take 2 tablets (1,000 mg) by mouth every 6 hours if needed.      cetirizine (ZyrTEC) 10 mg tablet Take 1 tablet (10 mg) by mouth once daily.      chlorhexidine (Peridex) 0.12 % solution Use as directed 473 mL 0    cyanocobalamin (Vitamin B-12) 500 mcg tablet Take 1 tablet (500 mcg) by mouth once daily.      folic acid (Folvite) 1 mg tablet Take 1 tablet (1 mg) by mouth once daily.      magnesium oxide 500 mg capsule Take 1 capsule (500 mg) by mouth once daily.      sertraline (Zoloft) 100 mg tablet Take 1 tablet (100 mg) by mouth once daily.      traZODone (Desyrel) 100 mg tablet Take 1 tablet (100 mg) by mouth daily at bedtime.      EnbreL SureClick 50 mg/mL (1 mL) pen injector pen injection Inject 1 mL (50 mg) under the skin once a week.       No current facility-administered medications for this visit.        Physical Exam:  Cardiovascular:      PMI at left midclavicular line. Normal rate. Regular rhythm. Normal S1. Normal S2.       Murmurs: There is no murmur.      No gallop.  No click. No  rub.   Pulses:     Intact distal pulses.   Edema:     Peripheral edema absent.         ROS:  Review of Systems   Constitutional: Negative.   Cardiovascular: Negative.    Respiratory: Negative.     Skin: Negative.    Musculoskeletal: Negative.    Gastrointestinal: Negative.    Genitourinary: Negative.    Neurological: Negative.           Last Labs:  CBC -  Lab Results   Component Value Date    WBC 5.2 10/09/2024    HGB 13.0 10/09/2024    HCT 37.3 10/09/2024    MCV 85 10/09/2024     10/09/2024       CMP -  Lab Results   Component Value Date    CALCIUM 9.3 10/09/2024    PROT 7.2 10/09/2024    ALBUMIN 4.3 10/09/2024    AST 25 10/09/2024    ALT 25 10/09/2024    ALKPHOS 124 (H) 10/09/2024    BILITOT 0.6 10/09/2024       LIPID PANEL -   Lab Results   Component Value Date    CHOL 112 (L) 02/11/2023    TRIG 154 (H) 02/11/2023    HDL 26 (L) 02/11/2023    CHHDL 4.3 02/11/2023       RENAL FUNCTION PANEL -   Lab Results   Component Value Date    GLUCOSE 75 10/09/2024     10/09/2024    K 3.9 10/09/2024     10/09/2024    CO2 29 10/09/2024    ANIONGAP 9 (L) 10/09/2024    BUN 13 10/09/2024    CREATININE 0.66 10/09/2024    CALCIUM 9.3 10/09/2024    ALBUMIN 4.3 10/09/2024        Lab Results   Component Value Date    HGBA1C 4.4 04/27/2023         Assessment/Plan   Problem List Items Addressed This Visit    None  Visit Diagnoses       Pre-op evaluation        Abnormal EKG               ? CAD.  Patient will return at age 38 for coronary artery calcium score as she has a family history of CAD.  Former smoking.  Quit in 2020.      Eleni Peraza, APRN-CNP

## 2024-10-22 ENCOUNTER — ANESTHESIA EVENT (OUTPATIENT)
Dept: OPERATING ROOM | Facility: HOSPITAL | Age: 34
End: 2024-10-22
Payer: COMMERCIAL

## 2024-10-22 SDOH — HEALTH STABILITY: MENTAL HEALTH: CURRENT SMOKER: 0

## 2024-10-22 NOTE — ANESTHESIA PREPROCEDURE EVALUATION
Patient: Raine Rothman    Procedure Information       Date/Time: 10/23/24 0700    Procedures:       Recession Gastrocnemius (Left)      Excision Bone Foot with Bone Graft (Left)      Arthrodesis Ankle (Anesthesia: GENERAL WITH POPLITEAL BLOCK, ADDUCTOR BLOCK  Materials Need: [employer] Rep to Contact: Farrukh Escobar ) (Left)    [facility]    Surgeons: Ayah Dominguez DPM          Past Medical History:   Diagnosis Date    Anxiety May 2022    On zoloft 100mg    Arthritis 2009    Rheumatoid arthritis; enbrel injections 50mg weekly    Autoimmune disorder (Multi)     Rheumatoid arthritis    Chronic pain disorder     Rheumatoid arthritis    Depression May 2022    On zoloft 100mg    Factor V Leiden (Multi)     Not on routine meds; usually get placed on lovenox after procedures    Gestational diabetes 2021    Resolved after birth    Immunocompromised     Rheumatoid arthritis    Intellectual disability May 2022    On zoloft 100mg daily    Joint pain     Rheumatoid arthritis    Obesity Unsure    Thrombocytopenia (CMS-HCC) May 2021    Resolved; was pregnant     Past Surgical History:   Procedure Laterality Date    ADENOIDECTOMY  At 2 years old     SECTION, LOW TRANSVERSE  2021    COLPOSCOPY  At 22 years old    Came back fine    OTHER SURGICAL HISTORY  2009 and 2009; L ankle stabilization 2016; L ankle arthroscopy    TONSILLECTOMY  2 years old    TYMPANOPLASTY  2 years old     Allergies   Allergen Reactions    Infliximab Anaphylaxis    Cefaclor Itching, Rash and Other    Piperacillin-Tazobactam Itching and Rash    Sulfa (Sulfonamide Antibiotics) Rash         Relevant Problems   No relevant active problems       Clinical information reviewed:                   NPO Detail:  No data recorded     Physical Exam    Airway  Mallampati: II  TM distance: >3 FB  Neck ROM: full     Cardiovascular   Comments: deferred   Dental    Pulmonary    Comments: deferred   Abdominal     Comments: deferred           Anesthesia Plan    History of general anesthesia?: yes  History of complications of general anesthesia?: no    ASA 2     general and regional   (ACB plus sciatic nerve block (pop block) plus GA)  The patient is not a current smoker.  Education provided regarding risk of obstructive sleep apnea.  intravenous induction   Postoperative administration of opioids is intended.  Anesthetic plan and risks discussed with patient.    Plan discussed with CRNA.

## 2024-10-23 ENCOUNTER — HOSPITAL ENCOUNTER (OUTPATIENT)
Facility: HOSPITAL | Age: 34
LOS: 1 days | Discharge: HOME | End: 2024-10-25
Attending: PODIATRIST | Admitting: PODIATRIST
Payer: COMMERCIAL

## 2024-10-23 ENCOUNTER — ANESTHESIA (OUTPATIENT)
Dept: OPERATING ROOM | Facility: HOSPITAL | Age: 34
End: 2024-10-23
Payer: COMMERCIAL

## 2024-10-23 ENCOUNTER — APPOINTMENT (OUTPATIENT)
Dept: RADIOLOGY | Facility: HOSPITAL | Age: 34
End: 2024-10-23
Payer: COMMERCIAL

## 2024-10-23 DIAGNOSIS — M19.072 ARTHRITIS OF LEFT ANKLE: Primary | ICD-10-CM

## 2024-10-23 DIAGNOSIS — Z79.899 ON DEEP VEIN THROMBOSIS (DVT) PROPHYLAXIS: ICD-10-CM

## 2024-10-23 LAB — HCG UR QL IA.RAPID: NEGATIVE

## 2024-10-23 PROCEDURE — 7100000001 HC RECOVERY ROOM TIME - INITIAL BASE CHARGE: Performed by: PODIATRIST

## 2024-10-23 PROCEDURE — 81025 URINE PREGNANCY TEST: CPT | Performed by: PODIATRIST

## 2024-10-23 PROCEDURE — 7100000011 HC EXTENDED STAY RECOVERY HOURLY - NURSING UNIT

## 2024-10-23 PROCEDURE — 2500000004 HC RX 250 GENERAL PHARMACY W/ HCPCS (ALT 636 FOR OP/ED): Mod: JZ | Performed by: PHARMACIST

## 2024-10-23 PROCEDURE — 3700000001 HC GENERAL ANESTHESIA TIME - INITIAL BASE CHARGE: Performed by: PODIATRIST

## 2024-10-23 PROCEDURE — 73610 X-RAY EXAM OF ANKLE: CPT | Mod: LEFT SIDE | Performed by: RADIOLOGY

## 2024-10-23 PROCEDURE — 73610 X-RAY EXAM OF ANKLE: CPT | Mod: LT

## 2024-10-23 PROCEDURE — 2500000001 HC RX 250 WO HCPCS SELF ADMINISTERED DRUGS (ALT 637 FOR MEDICARE OP): Performed by: STUDENT IN AN ORGANIZED HEALTH CARE EDUCATION/TRAINING PROGRAM

## 2024-10-23 PROCEDURE — 7100000002 HC RECOVERY ROOM TIME - EACH INCREMENTAL 1 MINUTE: Performed by: PODIATRIST

## 2024-10-23 PROCEDURE — C1734 ORTH/DEVIC/DRUG BN/BN,TIS/BN: HCPCS | Performed by: PODIATRIST

## 2024-10-23 PROCEDURE — 3600000009 HC OR TIME - EACH INCREMENTAL 1 MINUTE - PROCEDURE LEVEL FOUR: Performed by: PODIATRIST

## 2024-10-23 PROCEDURE — 2500000005 HC RX 250 GENERAL PHARMACY W/O HCPCS: Performed by: ANESTHESIOLOGY

## 2024-10-23 PROCEDURE — 2780000003 HC OR 278 NO HCPCS: Performed by: PODIATRIST

## 2024-10-23 PROCEDURE — 2500000004 HC RX 250 GENERAL PHARMACY W/ HCPCS (ALT 636 FOR OP/ED): Performed by: ANESTHESIOLOGY

## 2024-10-23 PROCEDURE — 2720000007 HC OR 272 NO HCPCS: Performed by: PODIATRIST

## 2024-10-23 PROCEDURE — 3600000004 HC OR TIME - INITIAL BASE CHARGE - PROCEDURE LEVEL FOUR: Performed by: PODIATRIST

## 2024-10-23 PROCEDURE — 2500000001 HC RX 250 WO HCPCS SELF ADMINISTERED DRUGS (ALT 637 FOR MEDICARE OP)

## 2024-10-23 PROCEDURE — C1713 ANCHOR/SCREW BN/BN,TIS/BN: HCPCS | Performed by: PODIATRIST

## 2024-10-23 PROCEDURE — 2500000004 HC RX 250 GENERAL PHARMACY W/ HCPCS (ALT 636 FOR OP/ED): Performed by: NURSE ANESTHETIST, CERTIFIED REGISTERED

## 2024-10-23 PROCEDURE — 3700000002 HC GENERAL ANESTHESIA TIME - EACH INCREMENTAL 1 MINUTE: Performed by: PODIATRIST

## 2024-10-23 PROCEDURE — 99202 OFFICE O/P NEW SF 15 MIN: CPT | Performed by: STUDENT IN AN ORGANIZED HEALTH CARE EDUCATION/TRAINING PROGRAM

## 2024-10-23 PROCEDURE — C1769 GUIDE WIRE: HCPCS | Performed by: PODIATRIST

## 2024-10-23 DEVICE — IMPLANTABLE DEVICE: Type: IMPLANTABLE DEVICE | Site: FOOT | Status: FUNCTIONAL

## 2024-10-23 DEVICE — KIT, AUGMENT INJECTABLE 3CC: Type: IMPLANTABLE DEVICE | Site: FOOT | Status: FUNCTIONAL

## 2024-10-23 RX ORDER — HYDRALAZINE HYDROCHLORIDE 20 MG/ML
5 INJECTION INTRAMUSCULAR; INTRAVENOUS EVERY 30 MIN PRN
Status: DISCONTINUED | OUTPATIENT
Start: 2024-10-23 | End: 2024-10-23 | Stop reason: HOSPADM

## 2024-10-23 RX ORDER — TALC
3 POWDER (GRAM) TOPICAL NIGHTLY PRN
Status: DISCONTINUED | OUTPATIENT
Start: 2024-10-23 | End: 2024-10-25 | Stop reason: HOSPADM

## 2024-10-23 RX ORDER — ACETAMINOPHEN 160 MG/5ML
650 SOLUTION ORAL EVERY 4 HOURS PRN
Status: DISCONTINUED | OUTPATIENT
Start: 2024-10-23 | End: 2024-10-25 | Stop reason: HOSPADM

## 2024-10-23 RX ORDER — SODIUM CHLORIDE, SODIUM LACTATE, POTASSIUM CHLORIDE, CALCIUM CHLORIDE 600; 310; 30; 20 MG/100ML; MG/100ML; MG/100ML; MG/100ML
INJECTION, SOLUTION INTRAVENOUS CONTINUOUS PRN
Status: DISCONTINUED | OUTPATIENT
Start: 2024-10-23 | End: 2024-10-23

## 2024-10-23 RX ORDER — ROCURONIUM BROMIDE 10 MG/ML
INJECTION, SOLUTION INTRAVENOUS AS NEEDED
Status: DISCONTINUED | OUTPATIENT
Start: 2024-10-23 | End: 2024-10-23

## 2024-10-23 RX ORDER — DEXAMETHASONE SODIUM PHOSPHATE 10 MG/ML
INJECTION INTRAMUSCULAR; INTRAVENOUS AS NEEDED
Status: DISCONTINUED | OUTPATIENT
Start: 2024-10-23 | End: 2024-10-23

## 2024-10-23 RX ORDER — VIT C/E/ZN/COPPR/LUTEIN/ZEAXAN 250MG-90MG
5000 CAPSULE ORAL DAILY
Status: DISCONTINUED | OUTPATIENT
Start: 2024-10-24 | End: 2024-10-25 | Stop reason: HOSPADM

## 2024-10-23 RX ORDER — ALBUTEROL SULFATE 0.83 MG/ML
2.5 SOLUTION RESPIRATORY (INHALATION) ONCE AS NEEDED
Status: DISCONTINUED | OUTPATIENT
Start: 2024-10-23 | End: 2024-10-23 | Stop reason: HOSPADM

## 2024-10-23 RX ORDER — ROPIVACAINE HYDROCHLORIDE 5 MG/ML
INJECTION, SOLUTION EPIDURAL; INFILTRATION; PERINEURAL AS NEEDED
Status: DISCONTINUED | OUTPATIENT
Start: 2024-10-23 | End: 2024-10-23

## 2024-10-23 RX ORDER — SIMETHICONE 80 MG
80 TABLET,CHEWABLE ORAL 4 TIMES DAILY PRN
Status: DISCONTINUED | OUTPATIENT
Start: 2024-10-23 | End: 2024-10-25 | Stop reason: HOSPADM

## 2024-10-23 RX ORDER — FENTANYL CITRATE 50 UG/ML
100 INJECTION, SOLUTION INTRAMUSCULAR; INTRAVENOUS ONCE
Status: COMPLETED | OUTPATIENT
Start: 2024-10-23 | End: 2024-10-23

## 2024-10-23 RX ORDER — PHYTONADIONE 5 MG/1
2.5 TABLET ORAL DAILY
Status: DISCONTINUED | OUTPATIENT
Start: 2024-10-23 | End: 2024-10-23

## 2024-10-23 RX ORDER — BUPIVACAINE HCL/EPINEPHRINE 0.5-1:200K
VIAL (ML) INJECTION AS NEEDED
Status: DISCONTINUED | OUTPATIENT
Start: 2024-10-23 | End: 2024-10-23

## 2024-10-23 RX ORDER — TRAZODONE HYDROCHLORIDE 50 MG/1
100 TABLET ORAL NIGHTLY
Status: DISCONTINUED | OUTPATIENT
Start: 2024-10-23 | End: 2024-10-25 | Stop reason: HOSPADM

## 2024-10-23 RX ORDER — FENTANYL CITRATE 50 UG/ML
INJECTION, SOLUTION INTRAMUSCULAR; INTRAVENOUS AS NEEDED
Status: DISCONTINUED | OUTPATIENT
Start: 2024-10-23 | End: 2024-10-23

## 2024-10-23 RX ORDER — FENTANYL CITRATE 50 UG/ML
50 INJECTION, SOLUTION INTRAMUSCULAR; INTRAVENOUS EVERY 5 MIN PRN
Status: DISCONTINUED | OUTPATIENT
Start: 2024-10-23 | End: 2024-10-23 | Stop reason: HOSPADM

## 2024-10-23 RX ORDER — KETOROLAC TROMETHAMINE 30 MG/ML
INJECTION, SOLUTION INTRAMUSCULAR; INTRAVENOUS AS NEEDED
Status: DISCONTINUED | OUTPATIENT
Start: 2024-10-23 | End: 2024-10-23

## 2024-10-23 RX ORDER — LABETALOL HYDROCHLORIDE 5 MG/ML
5 INJECTION, SOLUTION INTRAVENOUS ONCE AS NEEDED
Status: DISCONTINUED | OUTPATIENT
Start: 2024-10-23 | End: 2024-10-23 | Stop reason: HOSPADM

## 2024-10-23 RX ORDER — VANCOMYCIN HYDROCHLORIDE 1 G/20ML
INJECTION, POWDER, LYOPHILIZED, FOR SOLUTION INTRAVENOUS DAILY PRN
Status: DISCONTINUED | OUTPATIENT
Start: 2024-10-23 | End: 2024-10-23

## 2024-10-23 RX ORDER — SERTRALINE HYDROCHLORIDE 50 MG/1
100 TABLET, FILM COATED ORAL DAILY
Status: DISCONTINUED | OUTPATIENT
Start: 2024-10-24 | End: 2024-10-25 | Stop reason: HOSPADM

## 2024-10-23 RX ORDER — CETIRIZINE HYDROCHLORIDE 10 MG/1
10 TABLET ORAL DAILY
Status: DISCONTINUED | OUTPATIENT
Start: 2024-10-24 | End: 2024-10-25 | Stop reason: HOSPADM

## 2024-10-23 RX ORDER — VIT C/E/ZN/COPPR/LUTEIN/ZEAXAN 250MG-90MG
500 CAPSULE ORAL
Status: DISCONTINUED | OUTPATIENT
Start: 2024-10-24 | End: 2024-10-25 | Stop reason: HOSPADM

## 2024-10-23 RX ORDER — CLINDAMYCIN HYDROCHLORIDE 150 MG/1
300 CAPSULE ORAL EVERY 8 HOURS SCHEDULED
Status: DISCONTINUED | OUTPATIENT
Start: 2024-10-23 | End: 2024-10-25 | Stop reason: HOSPADM

## 2024-10-23 RX ORDER — MIDAZOLAM HYDROCHLORIDE 1 MG/ML
2 INJECTION, SOLUTION INTRAMUSCULAR; INTRAVENOUS ONCE
Status: COMPLETED | OUTPATIENT
Start: 2024-10-23 | End: 2024-10-23

## 2024-10-23 RX ORDER — VANCOMYCIN 2 G/400ML
2 INJECTION, SOLUTION INTRAVENOUS ONCE
Status: COMPLETED | OUTPATIENT
Start: 2024-10-23 | End: 2024-10-23

## 2024-10-23 RX ORDER — ACETAMINOPHEN 325 MG/1
650 TABLET ORAL EVERY 4 HOURS PRN
Status: DISCONTINUED | OUTPATIENT
Start: 2024-10-23 | End: 2024-10-25 | Stop reason: HOSPADM

## 2024-10-23 RX ORDER — ACETAMINOPHEN 650 MG/1
650 SUPPOSITORY RECTAL EVERY 4 HOURS PRN
Status: DISCONTINUED | OUTPATIENT
Start: 2024-10-23 | End: 2024-10-25 | Stop reason: HOSPADM

## 2024-10-23 RX ORDER — SODIUM CHLORIDE, SODIUM LACTATE, POTASSIUM CHLORIDE, CALCIUM CHLORIDE 600; 310; 30; 20 MG/100ML; MG/100ML; MG/100ML; MG/100ML
100 INJECTION, SOLUTION INTRAVENOUS CONTINUOUS
Status: DISCONTINUED | OUTPATIENT
Start: 2024-10-23 | End: 2024-10-23 | Stop reason: HOSPADM

## 2024-10-23 RX ORDER — ONDANSETRON HYDROCHLORIDE 2 MG/ML
4 INJECTION, SOLUTION INTRAVENOUS ONCE AS NEEDED
Status: DISCONTINUED | OUTPATIENT
Start: 2024-10-23 | End: 2024-10-23 | Stop reason: HOSPADM

## 2024-10-23 RX ORDER — OXYCODONE HYDROCHLORIDE 5 MG/1
5 TABLET ORAL EVERY 6 HOURS PRN
Status: DISCONTINUED | OUTPATIENT
Start: 2024-10-23 | End: 2024-10-25 | Stop reason: HOSPADM

## 2024-10-23 RX ORDER — ONDANSETRON 4 MG/1
4 TABLET, ORALLY DISINTEGRATING ORAL EVERY 8 HOURS PRN
Status: DISCONTINUED | OUTPATIENT
Start: 2024-10-23 | End: 2024-10-25 | Stop reason: HOSPADM

## 2024-10-23 RX ORDER — LANOLIN ALCOHOL/MO/W.PET/CERES
400 CREAM (GRAM) TOPICAL DAILY
Status: DISCONTINUED | OUTPATIENT
Start: 2024-10-24 | End: 2024-10-25 | Stop reason: HOSPADM

## 2024-10-23 RX ORDER — ENOXAPARIN SODIUM 100 MG/ML
40 INJECTION SUBCUTANEOUS DAILY
Status: DISCONTINUED | OUTPATIENT
Start: 2024-10-24 | End: 2024-10-25 | Stop reason: HOSPADM

## 2024-10-23 RX ORDER — ACETAMINOPHEN 500 MG
5000 TABLET ORAL
COMMUNITY

## 2024-10-23 RX ORDER — LIDOCAINE HYDROCHLORIDE 10 MG/ML
INJECTION, SOLUTION EPIDURAL; INFILTRATION; INTRACAUDAL; PERINEURAL AS NEEDED
Status: DISCONTINUED | OUTPATIENT
Start: 2024-10-23 | End: 2024-10-23

## 2024-10-23 RX ORDER — MEPERIDINE HYDROCHLORIDE 25 MG/ML
12.5 INJECTION INTRAMUSCULAR; INTRAVENOUS; SUBCUTANEOUS EVERY 10 MIN PRN
Status: DISCONTINUED | OUTPATIENT
Start: 2024-10-23 | End: 2024-10-23 | Stop reason: HOSPADM

## 2024-10-23 RX ORDER — ONDANSETRON HYDROCHLORIDE 2 MG/ML
INJECTION, SOLUTION INTRAVENOUS AS NEEDED
Status: DISCONTINUED | OUTPATIENT
Start: 2024-10-23 | End: 2024-10-23

## 2024-10-23 RX ORDER — BUPIVACAINE HYDROCHLORIDE 5 MG/ML
INJECTION, SOLUTION PERINEURAL AS NEEDED
Status: DISCONTINUED | OUTPATIENT
Start: 2024-10-23 | End: 2024-10-23

## 2024-10-23 RX ORDER — ACETAMINOPHEN 500 MG
1000 TABLET ORAL EVERY 6 HOURS PRN
Status: DISCONTINUED | OUTPATIENT
Start: 2024-10-23 | End: 2024-10-25 | Stop reason: HOSPADM

## 2024-10-23 RX ORDER — FOLIC ACID 1 MG/1
1 TABLET ORAL DAILY
Status: DISCONTINUED | OUTPATIENT
Start: 2024-10-24 | End: 2024-10-25 | Stop reason: HOSPADM

## 2024-10-23 RX ORDER — ESMOLOL HYDROCHLORIDE 10 MG/ML
INJECTION INTRAVENOUS AS NEEDED
Status: DISCONTINUED | OUTPATIENT
Start: 2024-10-23 | End: 2024-10-23

## 2024-10-23 RX ORDER — PROPOFOL 10 MG/ML
INJECTION, EMULSION INTRAVENOUS AS NEEDED
Status: DISCONTINUED | OUTPATIENT
Start: 2024-10-23 | End: 2024-10-23

## 2024-10-23 SDOH — ECONOMIC STABILITY: FOOD INSECURITY: WITHIN THE PAST 12 MONTHS, YOU WORRIED THAT YOUR FOOD WOULD RUN OUT BEFORE YOU GOT THE MONEY TO BUY MORE.: NEVER TRUE

## 2024-10-23 SDOH — ECONOMIC STABILITY: FOOD INSECURITY: WITHIN THE PAST 12 MONTHS, THE FOOD YOU BOUGHT JUST DIDN'T LAST AND YOU DIDN'T HAVE MONEY TO GET MORE.: NEVER TRUE

## 2024-10-23 SDOH — ECONOMIC STABILITY: INCOME INSECURITY: IN THE PAST 12 MONTHS HAS THE ELECTRIC, GAS, OIL, OR WATER COMPANY THREATENED TO SHUT OFF SERVICES IN YOUR HOME?: NO

## 2024-10-23 SDOH — ECONOMIC STABILITY: TRANSPORTATION INSECURITY
IN THE PAST 12 MONTHS, HAS THE LACK OF TRANSPORTATION KEPT YOU FROM MEDICAL APPOINTMENTS OR FROM GETTING MEDICATIONS?: NO

## 2024-10-23 SDOH — HEALTH STABILITY: PHYSICAL HEALTH: ON AVERAGE, HOW MANY MINUTES DO YOU ENGAGE IN EXERCISE AT THIS LEVEL?: 0 MIN

## 2024-10-23 SDOH — SOCIAL STABILITY: SOCIAL INSECURITY: HAS ANYONE EVER THREATENED TO HURT YOUR FAMILY OR YOUR PETS?: NO

## 2024-10-23 SDOH — HEALTH STABILITY: MENTAL HEALTH: HOW OFTEN DO YOU HAVE A DRINK CONTAINING ALCOHOL?: MONTHLY OR LESS

## 2024-10-23 SDOH — SOCIAL STABILITY: SOCIAL INSECURITY: ARE YOU MARRIED, WIDOWED, DIVORCED, SEPARATED, NEVER MARRIED, OR LIVING WITH A PARTNER?: MARRIED

## 2024-10-23 SDOH — SOCIAL STABILITY: SOCIAL NETWORK
DO YOU BELONG TO ANY CLUBS OR ORGANIZATIONS SUCH AS CHURCH GROUPS, UNIONS, FRATERNAL OR ATHLETIC GROUPS, OR SCHOOL GROUPS?: NO

## 2024-10-23 SDOH — SOCIAL STABILITY: SOCIAL INSECURITY: ABUSE: ADULT

## 2024-10-23 SDOH — HEALTH STABILITY: PHYSICAL HEALTH
HOW OFTEN DO YOU NEED TO HAVE SOMEONE HELP YOU WHEN YOU READ INSTRUCTIONS, PAMPHLETS, OR OTHER WRITTEN MATERIAL FROM YOUR DOCTOR OR PHARMACY?: NEVER

## 2024-10-23 SDOH — ECONOMIC STABILITY: TRANSPORTATION INSECURITY: IN THE PAST 12 MONTHS, HAS LACK OF TRANSPORTATION KEPT YOU FROM MEDICAL APPOINTMENTS OR FROM GETTING MEDICATIONS?: NO

## 2024-10-23 SDOH — SOCIAL STABILITY: SOCIAL INSECURITY: WITHIN THE LAST YEAR, HAVE YOU BEEN AFRAID OF YOUR PARTNER OR EX-PARTNER?: NO

## 2024-10-23 SDOH — ECONOMIC STABILITY: HOUSING INSECURITY

## 2024-10-23 SDOH — ECONOMIC STABILITY: INCOME INSECURITY: IN THE LAST 12 MONTHS, WAS THERE A TIME WHEN YOU WERE NOT ABLE TO PAY THE MORTGAGE OR RENT ON TIME?: NO

## 2024-10-23 SDOH — SOCIAL STABILITY: SOCIAL INSECURITY
WITHIN THE LAST YEAR, HAVE YOU BEEN RAPED OR FORCED TO HAVE ANY KIND OF SEXUAL ACTIVITY BY YOUR PARTNER OR EX-PARTNER?: NO

## 2024-10-23 SDOH — ECONOMIC STABILITY: TRANSPORTATION INSECURITY

## 2024-10-23 SDOH — HEALTH STABILITY: MENTAL HEALTH
DO YOU FEEL STRESS - TENSE, RESTLESS, NERVOUS, OR ANXIOUS, OR UNABLE TO SLEEP AT NIGHT BECAUSE YOUR MIND IS TROUBLED ALL THE TIME - THESE DAYS?: NOT AT ALL

## 2024-10-23 SDOH — ECONOMIC STABILITY: FOOD INSECURITY: WITHIN THE PAST 12 MONTHS, YOU WORRIED THAT YOUR FOOD WOULD RUN OUT BEFORE YOU GOT MONEY TO BUY MORE.: NEVER TRUE

## 2024-10-23 SDOH — SOCIAL STABILITY: SOCIAL NETWORK: IN A TYPICAL WEEK, HOW MANY TIMES DO YOU TALK ON THE PHONE WITH FAMILY, FRIENDS, OR NEIGHBORS?: THREE TIMES A WEEK

## 2024-10-23 SDOH — HEALTH STABILITY: MENTAL HEALTH: HOW OFTEN DO YOU HAVE SIX OR MORE DRINKS ON ONE OCCASION?: NEVER

## 2024-10-23 SDOH — SOCIAL STABILITY: SOCIAL INSECURITY: WITHIN THE LAST YEAR, HAVE YOU BEEN HUMILIATED OR EMOTIONALLY ABUSED IN OTHER WAYS BY YOUR PARTNER OR EX-PARTNER?: NO

## 2024-10-23 SDOH — SOCIAL STABILITY: SOCIAL INSECURITY: DO YOU FEEL ANYONE HAS EXPLOITED OR TAKEN ADVANTAGE OF YOU FINANCIALLY OR OF YOUR PERSONAL PROPERTY?: NO

## 2024-10-23 SDOH — ECONOMIC STABILITY: HOUSING INSECURITY: IN THE LAST 12 MONTHS, HOW MANY PLACES HAVE YOU LIVED?: 1

## 2024-10-23 SDOH — ECONOMIC STABILITY: HOUSING INSECURITY: IN THE LAST 12 MONTHS, WAS THERE A TIME WHEN YOU WERE NOT ABLE TO PAY THE MORTGAGE OR RENT ON TIME?: NO

## 2024-10-23 SDOH — HEALTH STABILITY: PHYSICAL HEALTH: ON AVERAGE, HOW MANY DAYS PER WEEK DO YOU ENGAGE IN MODERATE TO STRENUOUS EXERCISE (LIKE A BRISK WALK)?: 0 DAYS

## 2024-10-23 SDOH — HEALTH STABILITY: MENTAL HEALTH: HOW MANY DRINKS CONTAINING ALCOHOL DO YOU HAVE ON A TYPICAL DAY WHEN YOU ARE DRINKING?: 1 OR 2

## 2024-10-23 SDOH — SOCIAL STABILITY: SOCIAL INSECURITY: DO YOU FEEL UNSAFE GOING BACK TO THE PLACE WHERE YOU ARE LIVING?: NO

## 2024-10-23 SDOH — SOCIAL STABILITY: SOCIAL NETWORK: HOW OFTEN DO YOU ATTEND CHURCH OR RELIGIOUS SERVICES?: MORE THAN 4 TIMES PER YEAR

## 2024-10-23 SDOH — SOCIAL STABILITY: SOCIAL INSECURITY: HAVE YOU HAD THOUGHTS OF HARMING ANYONE ELSE?: NO

## 2024-10-23 SDOH — ECONOMIC STABILITY: FOOD INSECURITY: HOW HARD IS IT FOR YOU TO PAY FOR THE VERY BASICS LIKE FOOD, HOUSING, MEDICAL CARE, AND HEATING?: NOT HARD AT ALL

## 2024-10-23 SDOH — SOCIAL STABILITY: SOCIAL INSECURITY: HAVE YOU HAD ANY THOUGHTS OF HARMING ANYONE ELSE?: NO

## 2024-10-23 SDOH — ECONOMIC STABILITY: TRANSPORTATION INSECURITY
IN THE PAST 12 MONTHS, HAS LACK OF TRANSPORTATION KEPT YOU FROM MEETINGS, WORK, OR FROM GETTING THINGS NEEDED FOR DAILY LIVING?: NO

## 2024-10-23 SDOH — SOCIAL STABILITY: SOCIAL NETWORK: HOW OFTEN DO YOU GET TOGETHER WITH FRIENDS OR RELATIVES?: ONCE A WEEK

## 2024-10-23 SDOH — SOCIAL STABILITY: SOCIAL INSECURITY
WITHIN THE LAST YEAR, HAVE YOU BEEN KICKED, HIT, SLAPPED, OR OTHERWISE PHYSICALLY HURT BY YOUR PARTNER OR EX-PARTNER?: NO

## 2024-10-23 SDOH — ECONOMIC STABILITY: HOUSING INSECURITY: IN THE PAST 12 MONTHS, HOW MANY TIMES HAVE YOU MOVED WHERE YOU WERE LIVING?: 0

## 2024-10-23 SDOH — ECONOMIC STABILITY: HOUSING INSECURITY: AT ANY TIME IN THE PAST 12 MONTHS, WERE YOU HOMELESS OR LIVING IN A SHELTER (INCLUDING NOW)?: NO

## 2024-10-23 SDOH — SOCIAL STABILITY: SOCIAL INSECURITY: WERE YOU ABLE TO COMPLETE ALL THE BEHAVIORAL HEALTH SCREENINGS?: YES

## 2024-10-23 SDOH — ECONOMIC STABILITY: GENERAL

## 2024-10-23 SDOH — SOCIAL STABILITY: SOCIAL NETWORK: HOW OFTEN DO YOU ATTEND MEETINGS OF THE CLUBS OR ORGANIZATIONS YOU BELONG TO?: 1 TO 4 TIMES PER YEAR

## 2024-10-23 SDOH — ECONOMIC STABILITY: FOOD INSECURITY

## 2024-10-23 SDOH — SOCIAL STABILITY: SOCIAL INSECURITY: ARE YOU OR HAVE YOU BEEN THREATENED OR ABUSED PHYSICALLY, EMOTIONALLY, OR SEXUALLY BY ANYONE?: NO

## 2024-10-23 SDOH — SOCIAL STABILITY: SOCIAL INSECURITY: ARE THERE ANY APPARENT SIGNS OF INJURIES/BEHAVIORS THAT COULD BE RELATED TO ABUSE/NEGLECT?: NO

## 2024-10-23 SDOH — ECONOMIC STABILITY: HOUSING INSECURITY: IN THE PAST 12 MONTHS HAS THE ELECTRIC, GAS, OIL, OR WATER COMPANY THREATENED TO SHUT OFF SERVICES IN YOUR HOME?: NO

## 2024-10-23 SDOH — SOCIAL STABILITY: SOCIAL INSECURITY: DOES ANYONE TRY TO KEEP YOU FROM HAVING/CONTACTING OTHER FRIENDS OR DOING THINGS OUTSIDE YOUR HOME?: NO

## 2024-10-23 SDOH — ECONOMIC STABILITY: HOUSING INSECURITY
IN THE LAST 12 MONTHS, WAS THERE A TIME WHEN YOU DID NOT HAVE A STEADY PLACE TO SLEEP OR SLEPT IN A SHELTER (INCLUDING NOW)?: YES

## 2024-10-23 ASSESSMENT — COGNITIVE AND FUNCTIONAL STATUS - GENERAL
MOBILITY SCORE: 20
TOILETING: A LITTLE
PATIENT BASELINE BEDBOUND: NO
MOBILITY SCORE: 20
TOILETING: A LITTLE
HELP NEEDED FOR BATHING: A LITTLE
CLIMB 3 TO 5 STEPS WITH RAILING: A LITTLE
WALKING IN HOSPITAL ROOM: A LITTLE
DAILY ACTIVITIY SCORE: 20
MOVING TO AND FROM BED TO CHAIR: A LITTLE
HELP NEEDED FOR BATHING: A LITTLE
DRESSING REGULAR LOWER BODY CLOTHING: A LITTLE
STANDING UP FROM CHAIR USING ARMS: A LITTLE
DRESSING REGULAR LOWER BODY CLOTHING: A LITTLE
WALKING IN HOSPITAL ROOM: A LITTLE
CLIMB 3 TO 5 STEPS WITH RAILING: A LITTLE
STANDING UP FROM CHAIR USING ARMS: A LITTLE
DAILY ACTIVITIY SCORE: 21
DRESSING REGULAR UPPER BODY CLOTHING: A LITTLE
MOVING TO AND FROM BED TO CHAIR: A LITTLE

## 2024-10-23 ASSESSMENT — ACTIVITIES OF DAILY LIVING (ADL)
WALKS IN HOME: INDEPENDENT
LACK_OF_TRANSPORTATION: NO
LACK_OF_TRANSPORTATION: NO
BATHING: INDEPENDENT
PATIENT'S MEMORY ADEQUATE TO SAFELY COMPLETE DAILY ACTIVITIES?: YES
HEARING - RIGHT EAR: FUNCTIONAL
LACK_OF_TRANSPORTATION: NO
GROOMING: INDEPENDENT
ADEQUATE_TO_COMPLETE_ADL: YES
TOILETING: INDEPENDENT
JUDGMENT_ADEQUATE_SAFELY_COMPLETE_DAILY_ACTIVITIES: YES
DRESSING YOURSELF: INDEPENDENT
FEEDING YOURSELF: INDEPENDENT
HEARING - LEFT EAR: FUNCTIONAL

## 2024-10-23 ASSESSMENT — PATIENT HEALTH QUESTIONNAIRE - PHQ9
SUM OF ALL RESPONSES TO PHQ9 QUESTIONS 1 & 2: 0
1. LITTLE INTEREST OR PLEASURE IN DOING THINGS: NOT AT ALL
2. FEELING DOWN, DEPRESSED OR HOPELESS: NOT AT ALL

## 2024-10-23 ASSESSMENT — PAIN DESCRIPTION - DESCRIPTORS
DESCRIPTORS: ACHING

## 2024-10-23 ASSESSMENT — PAIN SCALES - GENERAL
PAINLEVEL_OUTOF10: 1
PAINLEVEL_OUTOF10: 4
PAINLEVEL_OUTOF10: 4
PAINLEVEL_OUTOF10: 1
PAIN_LEVEL: 4
PAINLEVEL_OUTOF10: 5 - MODERATE PAIN
PAINLEVEL_OUTOF10: 4
PAINLEVEL_OUTOF10: 7
PAINLEVEL_OUTOF10: 1
PAINLEVEL_OUTOF10: 3
PAINLEVEL_OUTOF10: 0 - NO PAIN
PAINLEVEL_OUTOF10: 0 - NO PAIN
PAINLEVEL_OUTOF10: 1
PAINLEVEL_OUTOF10: 4
PAINLEVEL_OUTOF10: 4

## 2024-10-23 ASSESSMENT — COLUMBIA-SUICIDE SEVERITY RATING SCALE - C-SSRS
1. IN THE PAST MONTH, HAVE YOU WISHED YOU WERE DEAD OR WISHED YOU COULD GO TO SLEEP AND NOT WAKE UP?: NO
6. HAVE YOU EVER DONE ANYTHING, STARTED TO DO ANYTHING, OR PREPARED TO DO ANYTHING TO END YOUR LIFE?: NO
2. HAVE YOU ACTUALLY HAD ANY THOUGHTS OF KILLING YOURSELF?: NO

## 2024-10-23 ASSESSMENT — LIFESTYLE VARIABLES
HOW OFTEN DO YOU HAVE A DRINK CONTAINING ALCOHOL: MONTHLY OR LESS
AUDIT-C TOTAL SCORE: 1
AUDIT-C TOTAL SCORE: 1
SUBSTANCE_ABUSE_PAST_12_MONTHS: NO
HOW OFTEN DO YOU HAVE 6 OR MORE DRINKS ON ONE OCCASION: NEVER
PRESCIPTION_ABUSE_PAST_12_MONTHS: NO
HOW MANY STANDARD DRINKS CONTAINING ALCOHOL DO YOU HAVE ON A TYPICAL DAY: 1 OR 2
AUDIT-C TOTAL SCORE: 1
SKIP TO QUESTIONS 9-10: 1
SKIP TO QUESTIONS 9-10: 1

## 2024-10-23 ASSESSMENT — PAIN - FUNCTIONAL ASSESSMENT
PAIN_FUNCTIONAL_ASSESSMENT: 0-10
PAIN_FUNCTIONAL_ASSESSMENT: WONG-BAKER FACES

## 2024-10-23 ASSESSMENT — PAIN DESCRIPTION - ORIENTATION: ORIENTATION: LEFT

## 2024-10-23 ASSESSMENT — SOCIAL DETERMINANTS OF HEALTH (SDOH): IN THE PAST 12 MONTHS, HAS THE ELECTRIC, GAS, OIL, OR WATER COMPANY THREATENED TO SHUT OFF SERVICE IN YOUR HOME?: NO

## 2024-10-23 ASSESSMENT — PAIN DESCRIPTION - LOCATION: LOCATION: ANKLE

## 2024-10-23 NOTE — CONSULTS
Consults    Reason For Consult  Medical management of post-op pain.    History Of Present Illness  Raine Rothman is a 34 y.o. female with history of rheumatoid arthritis who is postop day 0 status post left ankle arthrodesis.  Surgery was performed under general anesthesia.  She additionally received popliteal and adductor blocks and states that she feels numb and denies any pain at this time.  Vital signs have been stable postoperatively.  Patient denies any complaints at this time aside from hunger, meal tray was just provided.     Past Medical History  She has a past medical history of Anxiety (May 2022), Arthritis (2009), Autoimmune disorder (Multi) (2009), Chronic pain disorder (2009), Depression (May 2022), Factor V Leiden (Multi) (2008), Gestational diabetes (March 2021), Immunocompromised (2009), Intellectual disability (May 2022), Joint pain (2009), Obesity (Unsure), and Thrombocytopenia (CMS-HCC) (May 2021).    She has no past medical history of ADD (attention deficit disorder), ADHD (attention deficit hyperactivity disorder), HARRIET (acute kidney injury) (CMS-HCC), Autism ([facility]), Biliary disease, Bipolar disorder, Bladder cancer (Multi), BPH (benign prostatic hyperplasia), Cancer of neurologic system ([facility]), Celiac disease ([facility]), Cerebral aneurysm ([facility]), Cerebral palsy, Cerebral vascular accident (Multi), Cervical cancer, Cervical disc disease, Cholelithiasis, Chronic headaches, Chronic kidney disease, Cirrhosis (Multi), CKD (chronic kidney disease), Cognitive decline, Cognitive disorder, Colon polyp, Colorectal cancer (Multi), Crohn's disease (Multi), Dementia, Diverticulosis, Dizziness, Dysfunctional uterine bleeding, Dysphagia, Endometrial cancer (Multi), Esophageal cancer (Multi), Esophageal disease, ESRD (end stage renal disease) (Multi), Fibroid, Fibromyalgia, primary, Fractures, Gastric cancer (Multi), Gender dysphoria, GERD (gastroesophageal reflux disease), Gestational hypertension  ([facility]), GI (gastrointestinal bleed), Hemodialysis status (CMS-HCC), Hernia, internal, History of peritoneal dialysis, HIV disease (Multi), Hydrocephalus, Irritable bowel syndrome, Liver disease, Lumbar disc disease, Lupus, Mastocytosis, MS (multiple sclerosis) (Multi), Muscular dystrophy (Multi), Myasthenia gravis, Myoneural disorder (Multi), Myotonia, LAIRD (nonalcoholic steatohepatitis), Nephrolithiasis, Neuromuscular disorder (Multi), Ovarian cancer (Multi), Pancreatic cancer (Multi), Pancreatitis ([facility]), Pelvic mass, Peptic ulcer disease, Peripheral neuropathy, Personal history of other mental and behavioral disorders, Polycystic ovarian disease, Pregnant ([facility]), Prematurity ([facility]), Prostate cancer (Multi), PTSD (post-traumatic stress disorder), Pyelonephritis, Renal cancer (Multi), Renal disease due to hypertension, Schizophrenia, Scoliosis, Seizure disorder (Multi), Spinal stenosis, Substance addiction (Multi), Syncope, Thoracic spinal cord injury (Multi), TIA (transient ischemic attack), Ulcerative colitis, Urinary tract infection, Uterine cancer (Multi), or Vertigo.    Surgical History  She has a past surgical history that includes Other surgical history (2009 and );  section, low transverse (2021); Tympanoplasty (2 years old); Colposcopy (At 22 years old); Adenoidectomy (At 2 years old); and Tonsillectomy (2 years old).     Social History  She reports that she quit smoking about 9 years ago. Her smoking use included cigarettes. She has never used smokeless tobacco. She reports current alcohol use of about 1.0 standard drink of alcohol per week. She reports that she does not use drugs.    Family History  Family History   Problem Relation Name Age of Onset    Hypertension Mother Angi Ruiz     Cancer Father Tiago ruiz sr-small cell lung cancer     Arthritis Maternal Grandfather Torres hammer     Stroke Maternal Grandmother Liliana hammer     Depression Brother Tiago  Mitchel         Allergies  Infliximab, Cefaclor, Piperacillin-tazobactam, and Sulfa (sulfonamide antibiotics)    Review of Systems   All other systems reviewed and are negative.       Physical Exam  Constitutional:       General: She is not in acute distress.     Appearance: Normal appearance.   HENT:      Head: Normocephalic and atraumatic.      Nose: Nose normal.      Mouth/Throat:      Mouth: Mucous membranes are moist.      Pharynx: Oropharynx is clear.   Cardiovascular:      Rate and Rhythm: Normal rate and regular rhythm.   Pulmonary:      Effort: Pulmonary effort is normal. No respiratory distress.      Breath sounds: Normal breath sounds.   Abdominal:      General: Bowel sounds are normal.      Palpations: Abdomen is soft.      Tenderness: There is no abdominal tenderness. There is no rebound.   Musculoskeletal:         General: No swelling, deformity or signs of injury.      Cervical back: Normal range of motion and neck supple.      Comments: LLE with surgical dressing c/d/i   Skin:     General: Skin is warm and dry.   Neurological:      General: No focal deficit present.      Mental Status: She is alert and oriented to person, place, and time. Mental status is at baseline.   Psychiatric:         Attention and Perception: Attention and perception normal.         Mood and Affect: Mood normal.         Behavior: Behavior normal.         Judgment: Judgment normal.          Last Recorded Vitals  /66 (BP Location: Left arm, Patient Position: Lying)   Pulse 81   Temp 36.1 °C (97 °F) (Temporal)   Resp 16   Wt 140 kg (307 lb 15.7 oz)   SpO2 97%     Relevant Results  FL fluoro images no charge    Result Date: 10/23/2024  These images are not reportable by radiology and will not be interpreted by  Radiologists.    ECG 12 lead (Clinic Performed)    Result Date: 10/12/2024  Normal sinus rhythm Septal infarct , age undetermined Abnormal ECG No previous ECGs available Confirmed by Ann Velez ([id number]) on  10/12/2024 8:33:43 AM        Assessment/Plan     Rheumatoid arthritis, left ankle  Management per primary  PT/OT  Pain control, bowel regimen  Antibiotic and DVT prophylaxis per primary  Depression  Continue sertraline, trazodone  Factor V Leiden  Lovenox ppx    Michelle Lindsay MD

## 2024-10-23 NOTE — NURSING NOTE
1245pm Pt arrived to unit room 222, from [facility]. Pt A/Ox 4 denies any c/o pains no distress noted. Pt has left leg dressing with splint, and prevena wound vac intact with ice packs, exposed toes + for sensation able to wiggle without difficulty. Pt oriented to room and surroundings with call light use, placed in reach.

## 2024-10-23 NOTE — PERIOPERATIVE NURSING NOTE
Patient transferred to room 222 with this RN, tolerated well. Shannen RESENDEZ at bedside, care of patient assumed by med surg.

## 2024-10-23 NOTE — OP NOTE
Arthrodesis Ankle (Anesthesia: GENERAL WITH POPLITEAL BLOCK, ADDUCTOR BLOCK  Materials Need: Lissa Rep to Contact: Farrukh Escobar ) (L) Operative Note     Date: 10/23/2024  OR Location: [facility]    Name: Raine Rothman, : 1990, Age: 34 y.o., MRN: [id number], Sex: female    Diagnosis  Pre-op Diagnosis      * Oth rheumatoid arthritis w rheumatoid factor of left ank/ft (Multi) [M05.872]     * Fistula of left ankle [M25.172]     * Arthritis of left ankle [M19.072] Post-op Diagnosis     * Oth rheumatoid arthritis w rheumatoid factor of left ank/ft (Multi) [M05.872]     * Fistula of left ankle [M25.172]     * Arthritis of left ankle [M19.072]     Procedures  Arthrodesis Ankle (Anesthesia: GENERAL WITH POPLITEAL BLOCK, ADDUCTOR BLOCK  Materials Need: [employer]issa Rep to Contact: Farrukh Escobar )  [address] - [place] ARTHRODESIS ANKLE OPEN      Surgeons      * Ayah Dominguez - Primary    Resident/Fellow/Other Assistant:  Ashish Salazar DPM  Steffany Ndiaye DPM PGY2  Lourdes Jones MS-III    Procedure Summary  Anesthesia: Regional, General  ASA: II  Anesthesia Staff: Anesthesiologist: Juan Francisco Langston MD  CRNA: TIM MahoneyCRNA, DNP; DEE Larios  Estimated Blood Loss: 50mL  Intra-op Medications:   Administrations occurring from 0700 to 0930 on 10/23/24:   Medication Name Total Dose   fentaNYL PF (Sublimaze) injection 100 mcg 100 mcg   midazolam (Versed) injection 2 mg 2 mg   vancomycin (Xellia) 2 g in diluent combination  mL 2 g   bupivacaine (Marcaine) injection 0.5 % 20 mL   bupivacaine 0.5%-EPINEPHrine (Marcaine w/ EPI) injection 20 mL   dexAMETHasone PF 10 mg/mL 10 mg   esmolol (Brevibloc) injection 60 mg   LR infusion Cannot be calculated   lidocaine PF (Xylocaine-MPF) local injection 1 % 30 mg   propofol (Diprivan) injection 10 mg/mL 200 mg   rocuronium (ZeMuron) 50 mg/5 mL injection 100 mg   ropivacaine PF (Naropin) 0.5 % 20 mL              Anesthesia Record               Intraprocedure I/O Totals           Intake    LR infusion 600.00 mL    vancomycin (Xellia) 2 g in diluent combination  mL 400.00 mL    Total Intake 1000 mL       Output    Est. Blood Loss 50 mL    Total Output 50 mL       Net    Net Volume 950 mL          Specimen: No specimens collected     Staff:   Circulator: Madisyn  Scrub Person: Claudette  Circulator: Abena  Scrub Person: Yolanda         Drains and/or Catheters: * None in log *    Tourniquet Times:     Total Tourniquet Time Documented:  Leg (Left) - 157 minutes  Total: Leg (Left) - 157 minutes      Implants:  Implants       Type Name Action Serial No.      Screw SCREW, CANNULATED, ASNIS III, 6.5 X 60MM - [id number] Implanted      Implant KIT, AUGMENT INJECTABLE 3CC - [id number] Implanted      Screw PLATE, FUSION, ANT TT, UNIVERSAL, ORTHOLOC - [id number] Implanted      Screw SCREW, LOCKING, POLYAXIAL, 3.5 X 26MM - [id number] Implanted      Screw SCREW, LOCKING, 5.5 X 32MM, ORTHOLOC - [id number] Implanted       NON-LOCKING SCREW 5.5X30MM Implanted       LOCKING SCREW 5.5X34MM Implanted      Screw SCREW, LOCKING, 5.5MM, DRILL 3.8MM - [id number] Implanted      Screw PIN, TEMP FIXATION, THREADED, 2.0MM, ORTHOLOC - [id number] Implanted      Screw SCREW, CANNULATED, ASNIS III, 6.5 X 55MM - [id number] Implanted      Screw SCREW, CANNULATED, ASNIS III, 6.5 X 45MM - [id number] Implanted               Findings: Left ankle arthritic changes    Indications: Raine Rothman is an 34 y.o. female who is having surgery for Oth rheumatoid arthritis w rheumatoid factor of left ank/ft (Multi) [M05.872]  Fistula of left ankle [M25.172]  Arthritis of left ankle [M19.072].     The patient was seen in the preoperative area. The risks, benefits, complications, treatment options, non-operative alternatives, expected recovery and outcomes were discussed with the patient. The possibilities of reaction to medication, pulmonary aspiration, injury to surrounding structures, bleeding, recurrent infection, the need for  additional procedures, failure to diagnose a condition, and creating a complication requiring transfusion or operation were discussed with the patient. The patient concurred with the proposed plan, giving informed consent.  The site of surgery was properly noted/marked if necessary per policy. The patient has been actively warmed in preoperative area. Preoperative antibiotics have been ordered and given within 1 hours of incision. Venous thrombosis prophylaxis have been ordered including unilateral sequential compression device    Procedure Details:     PROCEDURAL NOTE  The patient was brought to the operating room and positioned supine on the operating room table.  A time-out was performed, identifying the patient by name, medical record number, date of birth, site of  procedure, and procedure to be performed.  The pt received their scheduled antibiotics and SCDs were placed for DVT prophylaxis. General anesthesia was induced by the Anesthesiology team.  An oral endotracheal tube was inserted. A preoperative adductor block and popliteal block was performed by the anesthesia team. All bony prominences were well padded. Patient was then secured with safety straps.  Attention was drawn to the left lower extremity. A well padded tourniquet was applied to the left thigh. The area was then prepped and draped in the usual sterile fashion.     [address] - [place] ARTHRODESIS ANKLE OPEN  Attention was directed to the ankle. Along the anterior aspect just lateral to the Tibialis Anterior tendon and medial to the extensor hallucis longus a skin marker was used to yesenia out the incision site at the level of the ankle joint. A curvilinear incision was made using a 15 blade to the level of the epidermis and dermis. The subcutaneous tissue was bluntly dissected using a wet sponge to expose the retinaculum over the extensor tendons. The retinaculum was sharply incised and tagged with 0 vicryl for later closure. Once the extensor tendons  were properly retracted, we encountered the ankle capsule which was sharply incised and reflected from the ankle joint. Care was taken to avoid neurovasculature which were properly retracted within the subcutaneous tissue.  All bleeders were ligated with bovie. The capsule was reflected from the distal tibia and talar neck using sharp dissection.     With appropriate exposure of the anterior aspect of the ankle joint, visualization of inflamed synovial tissue as well as arthritic changes of the anterior lip of the distal tibia and talar neck were noted. The inflamed synovial tissue was resected using a combination of rongeur. An egg-shaped kiara was utilized to resect and smooth out arthritic bone from the dorsum talar neck, anterior lip of the distal tibia, as well as the medial and lateral gutters of the ankle joint.  Any sharp and bony prominences were smooth out using the egg-shaped kiara as well as a mechanical rasp and rongeur for further resection. A Hinterman retractor was utilized to distract the ankle joint and the joint was prepped resecting cartilaginous surfaces from the talar dome and distal tibial facet using the power kiara and rongeur.  Care was taken to not overly resect too much bone to compromise the structure and integrity of bone and soft tissue anatomy. After sufficient resection of bone and smoothing of bony prominences, the surgical site was copiously irrigated with saline. The ankle joint was compressed utilizing an external compressor with the foot in neutral alignment with slight external rotation and eversion. It is worth noting that the rearfoot was still in varus despite putting the foot in neutral position but is in part due to the varus shape of her calcaneus and arthritic subtalar joint.    After deeming proper positioning of the foot, a K-wire was carefully driven distally from anteromedial distal tibia through the ankle joint and to the talar body not disturbing the subtalar joint.  Another K-wire was carefully driven distally from anterolateral distal tibia through the ankle joint and to the talar body again not disturbing the subtalar joint. After countersinking, measuring, drilling the trajectory of the K-wires, 6.5 x 55mm cannulated screw (anteromedial) and 6.5 x 45mm cannulated screw (anterolateral) were inserted. Excellent compression was noted within the arthrodesis site.Once that was completed, an anterior ankle arthrodesis plate was layed over the anterior aspect of the tibia and dorsum talar neck. The plate was temporarily fixated with BB-tack and was permanently fixated utilizing 3.5 screws to the talar neck which are noted above. The plate was eccentrically drilled thus providing compression to the anterior aspect of the ankle joint and the proximal aspect of the plate were permanently fixated using 5.5 screws which are also noted above. Arhtrodesis site appeared well opposed with intact hardware and excellent positioning of the foot relative to the ankle upon visualization through fluoro    The site was thoroughly irrigated with saline. [place] Augment agent was injected to the arthrodesis site. Closure was achieved with The ankle capsule was sutured close with 3-0 undyed vicryl. 2-0 vicryl retinaculum, 2-0 PDS subcutaneous tissue, and 3-0 nylon skin. A preveena incisional VAC was placed. The thigh tourniquet was  deflated and capillary refill was noted to all digits. A well padded Lind compression dressing and posterior splint was applied to the left lower extremity    The patient tolerated the procedure well, awakened from anesthesia without any difficulties, and was transferred to the patient in stable condition.    POST OP PLAN:  Patient will be sent to the floors and managed by internal medicine team for medical management.  All medications may be restarted per primary team except for biologics and pt may resume normal diet. If prominent strikethrough from dressing, please  reinforce dressing with DSD. Podiatry will continue to follow pt and assess pt's status and surgical site tomorrow.      Complications:  None; patient tolerated the procedure well.    Disposition: PACU - hemodynamically stable.  Condition: stable         Additional Details:     Attending Attestation:     Ayah Dominguez  Phone Number: [phone]

## 2024-10-23 NOTE — PERIOPERATIVE NURSING NOTE
Report given to med surg RN, patient meets qualifications for discharge from PACU at this time.    All patient's belongings returned to patient.

## 2024-10-23 NOTE — BRIEF OP NOTE
Date: 10/23/2024  OR Location: [facility]    Name: Raine Rothman, : 1990, Age: 34 y.o., MRN: [id number], Sex: female    Diagnosis  Pre-op Diagnosis      * Oth rheumatoid arthritis w rheumatoid factor of left ank/ft (Multi) [M05.872]     * Fistula of left ankle [M25.172]     * Arthritis of left ankle [M19.072] Post-op Diagnosis     * Oth rheumatoid arthritis w rheumatoid factor of left ank/ft (Multi) [M05.872]     * Fistula of left ankle [M25.172]     * Arthritis of left ankle [M19.072]     Procedures  Arthrodesis Ankle (Anesthesia: GENERAL WITH POPLITEAL BLOCK, ADDUCTOR BLOCK  Materials Need: [employer] Rep to Contact: Farrukh Escobar )  [address] - [place] ARTHRODESIS ANKLE OPEN    Injection of biologic agent (PDGF)    Surgeons      * Ayah Dominguez - Primary    Resident/Fellow/Other Assistant:  Ashish Salazar DPM  Steffany Ndiaye DPM PGY-2  Lourdes Jones MS-III    Procedure Summary  Anesthesia: Regional, General  ASA: II  Anesthesia Staff: Anesthesiologist: Juan Francisco Langston MD  CRNA: TIM MahoneyCRNA, DNP; DEE Larios  Estimated Blood Loss: 50mL  Intra-op Medications:   Administrations occurring from 0700 to 0930 on 10/23/24:   Medication Name Total Dose   fentaNYL PF (Sublimaze) injection 100 mcg 100 mcg   midazolam (Versed) injection 2 mg 2 mg   vancomycin (Xellia) 2 g in diluent combination  mL 2 g   bupivacaine (Marcaine) injection 0.5 % 20 mL   bupivacaine 0.5%-EPINEPHrine (Marcaine w/ EPI) injection 20 mL   dexAMETHasone PF 10 mg/mL 10 mg   esmolol (Brevibloc) injection 60 mg   LR infusion Cannot be calculated   lidocaine PF (Xylocaine-MPF) local injection 1 % 30 mg   propofol (Diprivan) injection 10 mg/mL 200 mg   rocuronium (ZeMuron) 50 mg/5 mL injection 100 mg   ropivacaine PF (Naropin) 0.5 % 20 mL              Anesthesia Record               Intraprocedure I/O Totals          Intake    LR infusion 600.00 mL    vancomycin (Xellia) 2 g in diluent combination  mL 400.00 mL    Total  Intake 1000 mL       Output    Est. Blood Loss 50 mL    Total Output 50 mL       Net    Net Volume 950 mL          Specimen: No specimens collected     Staff:   Circulator: Madisyn  Scrub Person: Claudette  Circulator: Abena  Scrub Person: Yolanda          Findings: Arthritic left ankle joint    Complications:  None; patient tolerated the procedure well.     Disposition: PACU - hemodynamically stable.  Condition: stable  Specimens Collected: No specimens collected  Attending Attestation:     Ayah Dominguez  Phone Number: [phone]

## 2024-10-23 NOTE — ANESTHESIA PROCEDURE NOTES
Goals: 1  Pain: 0  Level of Suicidality: Low     D:  Safia spoke  with this worker today about her feelings re:motherhood and how this has impacted her sense of self, her identity  She shared insights from attending a C-PTSD conference and indicated that her anxiety, as well as her feelings of guilt both remain high however she recognizes that they are irrational   A: Rafaela Campbell disclosed details of her suicide attempt as a young teen today during which time she overdosed on a considerable amount of pills after being berated by her parents for drinking underage  She indicated that she is "still today" awaiting validation from her parents   Monae Wilburn: Rafaela Campbell will  continue to be supported in addressing the above   Homework was assigned as work to find "liberation and clarity" begins          I spent 50minutes with the patient during this visit      This note was not shared with the patient due to this is a psychotherapy note  Encounter Diagnoses   Name Primary?     Major depressive disorder in full remission, unspecified whether recurrent ([facility] [address] )     Generalized anxiety disorder     Postpartum depression associated with first pregnancy Peripheral Block    Patient location during procedure: pre-op  Start time: 10/23/2024 7:17 AM  End time: 10/23/2024 7:19 AM  Reason for block: procedure for pain, at surgeon's request and post-op pain management  Staffing  Performed: attending   Authorized by: Juan Francisco Langston MD    Performed by: Juan Francisco Langston MD  Preanesthetic Checklist  Completed: patient identified, IV checked, site marked, risks and benefits discussed, surgical consent, monitors and equipment checked, pre-op evaluation and timeout performed   Timeout performed at: 10/23/2024 7:07 AM  Peripheral Block  Patient position: laying flat (Block leg elevated with wedge)  Prep: alcohol swabs  Patient monitoring: heart rate, cardiac monitor and continuous pulse ox  Block type: sciatic  Laterality: left  Injection technique: catheter  Guidance: nerve stimulator and ultrasound guided  Needle  Needle type: short-bevel   Needle gauge: 22 G  Needle length: 5 cm  Needle localization: nerve stimulator and ultrasound guidance  Assessment  Injection assessment: negative aspiration for heme, no paresthesia on injection, incremental injection and local visualized surrounding nerve on ultrasound  Paresthesia pain: none  Heart rate change: no  Slow fractionated injection: yes  Additional Notes  Dexamethasone 5mg added to local

## 2024-10-23 NOTE — ANESTHESIA PROCEDURE NOTES
Airway  Date/Time: 10/23/2024 7:47 AM  Urgency: elective    Airway not difficult    Staffing  Performed: CRNA   Authorized by: Juan Francisco Langston MD    Performed by: ESTEFANÍA Larios-FINN  Patient location during procedure: OR    Indications and Patient Condition  Indications for airway management: anesthesia and airway protection  Spontaneous ventilation: present  Sedation level: deep  Preoxygenated: yes  Patient position: sniffing  Mask difficulty assessment: 2 - vent by mask + OA or adjuvant +/- NMBA  No planned trial extubation    Final Airway Details  Final airway type: endotracheal airway      Successful airway: ETT  Cuffed: yes   Successful intubation technique: video laryngoscopy  Facilitating devices/methods: intubating stylet  Endotracheal tube insertion site: oral  Blade type: glidescope.  Blade size: #3  ETT size (mm): 7.0  Cormack-Lehane Classification: grade I - full view of glottis  Placement verified by: capnometry   Cuff volume (mL): 6  Measured from: lips  ETT to lips (cm): 21  Number of attempts at approach: 1  Number of other approaches attempted: 0    Additional Comments  Anterior with small limited oral opening following administration of muscle relaxant; easy mask ventilation with oral airway in place

## 2024-10-23 NOTE — PROGRESS NOTES
Physical Therapy                 Therapy Communication Note    Patient Name: Raine Rothman  MRN: [id number]  Department: [facility]  Room: [address] [place], [place] [address]  Today's Date: 10/23/2024     Discipline: Physical Therapy    Missed Visit Reason: Missed Visit Reason: Patient placed on medical hold, Other (Comment) (PT eval placed. Per Surgeon, pt placed on strict bedrest until morning of 10/24/24 ( 18 hours) and then will be NWB on RLE. PT evaluation will be completed after strict bed rest orders are completed and upon medical clearance.)    Missed Time: Cancel    Isaura Rodríguez, PT, DPT

## 2024-10-23 NOTE — ANESTHESIA PROCEDURE NOTES
Peripheral Block    Patient location during procedure: pre-op  Start time: 10/23/2024 7:20 AM  End time: 10/23/2024 7:20 AM  Reason for block: at surgeon's request and post-op pain management  Staffing  Performed: attending   Authorized by: Juan Francisco Langston MD    Performed by: Juan Francisco Langston MD  Preanesthetic Checklist  Completed: patient identified, IV checked, site marked, risks and benefits discussed, surgical consent, monitors and equipment checked, pre-op evaluation and timeout performed   Timeout performed at: 10/23/2024 7:07 AM  Peripheral Block  Patient position: laying flat  Prep: alcohol swabs, ChloraPrep and site prepped and draped  Patient monitoring: heart rate, cardiac monitor and continuous pulse ox  Block type: adductor canal  Laterality: left  Injection technique: single-shot  Guidance: nerve stimulator and ultrasound guided  Needle  Needle gauge: 21 G  Needle length: 10 cm  Needle localization: ultrasound guidance     image stored in chart  Needle insertion depth: 8 cm  Test dose: negative  Assessment  Injection assessment: negative aspiration for heme, no paresthesia on injection, local visualized surrounding nerve on ultrasound and incremental injection  Paresthesia pain: immediately resolved  Heart rate change: no  Slow fractionated injection: yes  Additional Notes  Dexamethasone 5 mg added to local anesthetic.

## 2024-10-23 NOTE — ANESTHESIA POSTPROCEDURE EVALUATION
Patient: Raine Rothman    Procedure Summary       Date: 10/23/24 Room / Location: [facility] / [facility]    Anesthesia Start: 0737 Anesthesia Stop: 1153    Procedure: Arthrodesis Ankle (Anesthesia: GENERAL WITH POPLITEAL BLOCK, ADDUCTOR BLOCK  Materials Need: [employer] Rep to Contact: Farrukh Escobar ) (Left) Diagnosis:       Oth rheumatoid arthritis w rheumatoid factor of left ank/ft (Multi)      Fistula of left ankle      Arthritis of left ankle      (Oth rheumatoid arthritis w rheumatoid factor of left ank/ft (Multi) [M05.872])      (Fistula of left ankle [M25.172])      (Arthritis of left ankle [M19.072])    Surgeons: Ayah Dominguez DPM Responsible Provider: Juan Francisco Langston MD    Anesthesia Type: general, regional ASA Status: 2            Anesthesia Type: general, regional    Vitals Value Taken Time   /89 10/23/24 1235   Temp 36.2 °C (97.2 °F) 10/23/24 1210   Pulse 82 10/23/24 1235   Resp 16 10/23/24 1235   SpO2 95 % 10/23/24 1230       Anesthesia Post Evaluation    Patient location during evaluation: PACU  Patient participation: complete - patient participated  Level of consciousness: awake and alert  Pain score: 4  Pain management: adequate  Multimodal analgesia pain management approach  Airway patency: patent  Two or more strategies used to mitigate risk of obstructive sleep apnea  Cardiovascular status: acceptable and blood pressure returned to baseline  Respiratory status: acceptable  Hydration status: acceptable  Postoperative Nausea and Vomiting: none  Comments: Blocks appear to be working adequately.        There were no known notable events for this encounter.

## 2024-10-24 LAB
ERYTHROCYTE [DISTWIDTH] IN BLOOD BY AUTOMATED COUNT: 13 % (ref 11.5–14.5)
HCT VFR BLD AUTO: 33.1 % (ref 36–46)
HGB BLD-MCNC: 11 G/DL (ref 12–16)
MCH RBC QN AUTO: 28.6 PG (ref 26–34)
MCHC RBC AUTO-ENTMCNC: 33.2 G/DL (ref 32–36)
MCV RBC AUTO: 86 FL (ref 80–100)
NRBC BLD-RTO: ABNORMAL /100{WBCS}
PLATELET # BLD AUTO: 170 X10*3/UL (ref 150–450)
RBC # BLD AUTO: 3.84 X10*6/UL (ref 4–5.2)
WBC # BLD AUTO: 8.5 X10*3/UL (ref 4.4–11.3)

## 2024-10-24 PROCEDURE — 2500000004 HC RX 250 GENERAL PHARMACY W/ HCPCS (ALT 636 FOR OP/ED)

## 2024-10-24 PROCEDURE — 36415 COLL VENOUS BLD VENIPUNCTURE: CPT | Performed by: STUDENT IN AN ORGANIZED HEALTH CARE EDUCATION/TRAINING PROGRAM

## 2024-10-24 PROCEDURE — 96372 THER/PROPH/DIAG INJ SC/IM: CPT | Performed by: STUDENT IN AN ORGANIZED HEALTH CARE EDUCATION/TRAINING PROGRAM

## 2024-10-24 PROCEDURE — 97162 PT EVAL MOD COMPLEX 30 MIN: CPT | Mod: GP

## 2024-10-24 PROCEDURE — 7100000011 HC EXTENDED STAY RECOVERY HOURLY - NURSING UNIT

## 2024-10-24 PROCEDURE — 2500000001 HC RX 250 WO HCPCS SELF ADMINISTERED DRUGS (ALT 637 FOR MEDICARE OP)

## 2024-10-24 PROCEDURE — 97530 THERAPEUTIC ACTIVITIES: CPT | Mod: GP

## 2024-10-24 PROCEDURE — 2500000004 HC RX 250 GENERAL PHARMACY W/ HCPCS (ALT 636 FOR OP/ED): Performed by: STUDENT IN AN ORGANIZED HEALTH CARE EDUCATION/TRAINING PROGRAM

## 2024-10-24 PROCEDURE — 99232 SBSQ HOSP IP/OBS MODERATE 35: CPT | Performed by: STUDENT IN AN ORGANIZED HEALTH CARE EDUCATION/TRAINING PROGRAM

## 2024-10-24 PROCEDURE — 2500000002 HC RX 250 W HCPCS SELF ADMINISTERED DRUGS (ALT 637 FOR MEDICARE OP, ALT 636 FOR OP/ED)

## 2024-10-24 PROCEDURE — 97116 GAIT TRAINING THERAPY: CPT | Mod: GP

## 2024-10-24 PROCEDURE — 2500000001 HC RX 250 WO HCPCS SELF ADMINISTERED DRUGS (ALT 637 FOR MEDICARE OP): Performed by: STUDENT IN AN ORGANIZED HEALTH CARE EDUCATION/TRAINING PROGRAM

## 2024-10-24 PROCEDURE — 85027 COMPLETE CBC AUTOMATED: CPT | Performed by: STUDENT IN AN ORGANIZED HEALTH CARE EDUCATION/TRAINING PROGRAM

## 2024-10-24 ASSESSMENT — PAIN - FUNCTIONAL ASSESSMENT
PAIN_FUNCTIONAL_ASSESSMENT: 0-10
PAIN_FUNCTIONAL_ASSESSMENT: WONG-BAKER FACES
PAIN_FUNCTIONAL_ASSESSMENT: 0-10

## 2024-10-24 ASSESSMENT — PAIN DESCRIPTION - DESCRIPTORS
DESCRIPTORS: ACHING

## 2024-10-24 ASSESSMENT — COGNITIVE AND FUNCTIONAL STATUS - GENERAL
DRESSING REGULAR LOWER BODY CLOTHING: A LITTLE
MOVING FROM LYING ON BACK TO SITTING ON SIDE OF FLAT BED WITH BEDRAILS: A LITTLE
HELP NEEDED FOR BATHING: A LITTLE
TOILETING: A LITTLE
HELP NEEDED FOR BATHING: A LITTLE
DAILY ACTIVITIY SCORE: 21
MOBILITY SCORE: 18
MOVING TO AND FROM BED TO CHAIR: A LITTLE
TOILETING: A LITTLE
STANDING UP FROM CHAIR USING ARMS: A LITTLE
STANDING UP FROM CHAIR USING ARMS: A LITTLE
MOBILITY SCORE: 19
WALKING IN HOSPITAL ROOM: A LITTLE
CLIMB 3 TO 5 STEPS WITH RAILING: A LITTLE
CLIMB 3 TO 5 STEPS WITH RAILING: A LOT
WALKING IN HOSPITAL ROOM: A LITTLE
WALKING IN HOSPITAL ROOM: A LITTLE
MOBILITY SCORE: 20
DRESSING REGULAR LOWER BODY CLOTHING: A LITTLE
MOVING TO AND FROM BED TO CHAIR: A LITTLE
MOVING TO AND FROM BED TO CHAIR: A LITTLE
STANDING UP FROM CHAIR USING ARMS: A LITTLE
CLIMB 3 TO 5 STEPS WITH RAILING: A LOT
DAILY ACTIVITIY SCORE: 21

## 2024-10-24 ASSESSMENT — PAIN DESCRIPTION - ORIENTATION: ORIENTATION: LEFT

## 2024-10-24 ASSESSMENT — PAIN DESCRIPTION - LOCATION: LOCATION: ANKLE

## 2024-10-24 ASSESSMENT — PAIN SCALES - WONG BAKER
WONGBAKER_NUMERICALRESPONSE: NO HURT
WONGBAKER_NUMERICALRESPONSE: HURTS EVEN MORE

## 2024-10-24 ASSESSMENT — PAIN SCALES - GENERAL
PAINLEVEL_OUTOF10: 2
PAINLEVEL_OUTOF10: 5 - MODERATE PAIN
PAINLEVEL_OUTOF10: 2
PAINLEVEL_OUTOF10: 7
PAINLEVEL_OUTOF10: 7
PAINLEVEL_OUTOF10: 3
PAINLEVEL_OUTOF10: 4
PAINLEVEL_OUTOF10: 3
PAINLEVEL_OUTOF10: 7
PAINLEVEL_OUTOF10: 0 - NO PAIN
PAINLEVEL_OUTOF10: 3
PAINLEVEL_OUTOF10: 5 - MODERATE PAIN
PAINLEVEL_OUTOF10: 7
PAINLEVEL_OUTOF10: 7

## 2024-10-24 ASSESSMENT — ACTIVITIES OF DAILY LIVING (ADL): LACK_OF_TRANSPORTATION: NO

## 2024-10-24 NOTE — NURSING NOTE
Vitals:    10/24/24 0700   BP: 144/74   Pulse: 70   Resp: 16   Temp: 36.1 °C (97 °F)   SpO2: 100%      Patient lying in bed. Vital Is stable. All needs met at this moment. Call light within reach. Bed at lowest position. Continue monitor.      Podiatry at bedside. Dressing changed.

## 2024-10-24 NOTE — NURSING NOTE
Nurse to nurse report received from DIPTI Pride.  Assumed care of patient.  Medications, labs, and orders reviewed.  Patient in bed, HOB elevated.  LLE elevated.  Dressing clean, dry, and intact.  Ice pack in place.  Brisk capillary refill to LLE. Patient is alert and oriented x 4.  Pt rates left foot pain 3/10 and PRN Tylenol given as ordered. Lungs are clear to auscultation bilaterally.  Goals discussed for shift.  Whiteboard updated.  Call light and possessions within reach.  Bed alarm on.  Patient denies any other needs at this time.

## 2024-10-24 NOTE — NURSING NOTE
Patient in bed with LLE elevated.  Dressing is clean, dry, and intact. Wound vac in place. Vitals are stable. Patient denies any needs at this time.

## 2024-10-24 NOTE — PROGRESS NOTES
Raine Rothman is a 34 y.o. female on day 1 of admission presenting with Arthritis of left ankle.      Subjective   No acute events overnight.  Patient was on strict bedrest until this morning.  Starting to have some pain in her ankle, which she rates 4.5/10.  Just took some Tylenol.  No other acute complaints.  Planning for discharge tomorrow.       Objective     Last Recorded Vitals  /74 (BP Location: Left arm, Patient Position: Lying)   Pulse 70   Temp 36.1 °C (97 °F) (Temporal)   Resp 16   Wt 140 kg (307 lb 15.7 oz)   SpO2 100%   Intake/Output last 3 Shifts:    Intake/Output Summary (Last 24 hours) at 10/24/2024 0915  Last data filed at 10/24/2024 0827  Gross per 24 hour   Intake 1520 ml   Output 1110 ml   Net 410 ml       Admission Weight  Weight: 140 kg (307 lb 15.7 oz) (10/23/24 0550)    Daily Weight  10/23/24 : 140 kg (307 lb 15.7 oz)    Image Results  XR ankle left 3+ views  Narrative: Interpreted By:  Sheryl Harrison,   STUDY:  XR ANKLE LEFT 3+ VIEWS;  10/23/2024 1:52 pm      INDICATION:  Signs/Symptoms:s/p left ankle arthrodesis.      COMPARISON:  06/07/2020      ACCESSION NUMBER(S):  [id number]      ORDERING CLINICIAN:  ANNETTE GORDILLO      FINDINGS:  The patient is in a cast, which obscures bony detail and anterior  plate and multiple screws are seen transfixing the tibiotalar joint.  The most superior screw that is transfixing the plate appears to have  appears to have fracture through the cortex of the posterior tibia.      Impression: Status post fusion of the tibiotalar joint with a small cortical  fracture at the level of the most superior screw.          MACRO:  None      Signed by: Sheryl Harrison 10/23/2024 2:13 PM  Dictation workstation:   [id number]  FL fluoro images no charge  These images are not reportable by radiology and will not be interpreted   by  Radiologists.      Physical Exam  Vitals and nursing note reviewed.   Constitutional:       General: She is not in acute distress.      Appearance: Normal appearance.   HENT:      Head: Normocephalic and atraumatic.      Nose: Nose normal.      Mouth/Throat:      Mouth: Mucous membranes are moist.      Pharynx: Oropharynx is clear.   Cardiovascular:      Rate and Rhythm: Normal rate and regular rhythm.   Pulmonary:      Effort: Pulmonary effort is normal. No respiratory distress.      Breath sounds: Normal breath sounds.   Abdominal:      General: Bowel sounds are normal.      Palpations: Abdomen is soft.      Tenderness: There is no abdominal tenderness. There is no rebound.   Musculoskeletal:         General: No swelling, deformity or signs of injury.      Cervical back: Normal range of motion and neck supple.      Comments: LLE dressing c/d/i   Skin:     General: Skin is warm and dry.   Neurological:      General: No focal deficit present.      Mental Status: She is alert and oriented to person, place, and time. Mental status is at baseline.   Psychiatric:         Attention and Perception: Attention and perception normal.         Mood and Affect: Mood normal.         Behavior: Behavior normal.         Judgment: Judgment normal.         Relevant Results      Results for orders placed or performed during the hospital encounter of 10/23/24 (from the past 24 hours)   CBC   Result Value Ref Range    WBC 8.5 4.4 - 11.3 x10*3/uL    nRBC      RBC 3.84 (L) 4.00 - 5.20 x10*6/uL    Hemoglobin 11.0 (L) 12.0 - 16.0 g/dL    Hematocrit 33.1 (L) 36.0 - 46.0 %    MCV 86 80 - 100 fL    MCH 28.6 26.0 - 34.0 pg    MCHC 33.2 32.0 - 36.0 g/dL    RDW 13.0 11.5 - 14.5 %    Platelets 170 150 - 450 x10*3/uL                Assessment/Plan                  Assessment & Plan  Arthritis of left ankle    Rheumatoid arthritis, left ankle  Management per primary  PT/OT  Pain control, bowel regimen  Antibiotic and DVT prophylaxis per primary  Depression  Continue sertraline, trazodone  Factor V Leiden  Lovenox ppx              Michelle Lindsay MD

## 2024-10-24 NOTE — PROGRESS NOTES
Hospitalist    Patient seen, chart reviewed.  Vital stable.  Patient awake in bed.  Pain is improved after Dilaudid.  No other complaints.

## 2024-10-24 NOTE — NURSING NOTE
Patient in bed, eyes closed.  Patient easily awakened for vitals.  Vitals are stable. LLE elevated; dressing clean, dry, and intact.  Ice pack in place.  Brisk capillary refill to LLE. Labs drawn and sent.  Bed in lowest position and alarm on.  Call light and possessions within reach.  Patient denies any needs at this time.

## 2024-10-24 NOTE — CARE PLAN
The patient's goals for the shift include pain control.    The clinical goals for the shift include Pain control.      Problem: Pain  Goal: Turns in bed with improved pain control throughout the shift  Outcome: Progressing     Problem: Pain  Goal: Free from opioid side effects throughout the shift  Outcome: Progressing     Problem: Safety - Adult  Goal: Free from fall injury  Outcome: Progressing     Problem: Chronic Conditions and Co-morbidities  Goal: Patient's chronic conditions and co-morbidity symptoms are monitored and maintained or improved  Outcome: Progressing

## 2024-10-24 NOTE — NURSING NOTE
Patient sleeping.  Respirations even and unlabored.  Call light and possessions within reach.  Bed alarm on.

## 2024-10-24 NOTE — NURSING NOTE
Vitals:    10/24/24 1511   BP: 121/64   Pulse: 71   Resp: 16   Temp: 36.2 °C (97.2 °F)   SpO2: 100%      Patient lying in bed. Vital Is stable. All needs met at this moment. Call light within reach. Bed at lowest position. Continue monitor.

## 2024-10-24 NOTE — PROGRESS NOTES
Patient Name: Raine Rothman  MRN: [id number]  Department: [facility]  Room: [address] [place] [address]  Today's Date: 10/24/2024       Assessment/Plan   Assessment and Plan  PT Assessment Results: Decreased strength, Decreased endurance, Impaired balance, Decreased mobility, Decreased coordination, Obesity, Orthopedic restrictions, Pain  PT Plan: Ongoing PT  Device Issued: Wheeled walker  Recommendations: 24 hour supervision with outpatient PT recommended for continued training on mobility, balance, and strength. Pt maintained NWB on LLE with device during session and completed ADLs without assist, but cued for safety with transfers. Pt refusing [facility] PT at this time for balance and mobility training.  will be home to assist and pt reports she will use knee scooter and wheel chair for mobility.  End of Session Communication: Bedside nurse  End of Session Patient Position: Up in chair (SCds on, call bell in reach, LLE elevated and ice placed)    Subjective   Info and History:  Surgical Information and Patient History  Date of Surgery: 10/23/24  PT Visit Date: 10/24/24 (Pt was on medical hold/ strict bed rest after surgery until 10/24 morning.)  Reason for Referral: Pt presenting to [facility] on 10/23/24 for  R ankle athrodesis by Dr. Dominguez.  History Relevant to Rehab: Anxiety,Rheumatoid arthritis,Chronic pain disorder Depression, Factor V Leiden, Gestational diabetes Immunocompromised, Intellectual disability, Obesity Thrombocytopenia  Prior to Session Communication: Bedside nurse  Patient Position Received: Bed, 3 rail up, Alarm on (cleared by RN and agreeable to session.)  Precautions:  Precautions  LE Weight Bearing Status: Left Non-Weight Bearing  Medical Precautions: Fall precautions  Surgical Dressing Intact: Yes (splint and ace wrap in place; wound vac in place and no fluid drainage noted)   Home Living:  Home Living  Type of Home: House  Lives With: Spouse  Home Adaptive Equipment: Walker rolling or standard, Cane, Wheelchair-manual  (knee scooter)  Home Layout: One level  Home Access: Ramped entrance  Home Living Comments:  to assist    Objective   Cognition:  Cognition/Behavior  Arousal/Alertness: Appropriate responses to stimuli  Orientation Level: Oriented X4  Following Commands: Follows all commands and directions without difficulty  Participating in Session: Yes  Pain:  Pain Assessment  Pain Assessment: Ferreira-Baker FACES  Ferreira-Baker FACES Pain Rating: Hurts even more  Pain Type: Surgical pain  Pain Location: Ankle  Pain Orientation: Left  Pain Descriptors: Aching  Pain Interventions: Cold applied, Rest, Repositioned  Response to Interventions: pt satisfied  Extremity Assessments:  RUE:   RUE   RUE : Within Functional Limits  LUE:  LUE   LUE: Within Functional Limits  RLE:  RLE   RLE : Within Functional Limits  LLE:  LLE   LLE :  (limited assessment due to surgical limitations/ precautions)    Ambulation/Gait Training:  Ambulation/Gait Training  Ambulation/Gait Training Performed: Yes  Ambulation/Gait Training 1  Surface 1: Level tile  Device 1: Bariatric rolling walker  Gait Support Devices: Gait belt  Assistance 1: Close supervision, Minimum assistance, Moderate verbal cues  Quality of Gait 1: Wide base of support, Diminished heel strike, Decreased step length, Forward flexed posture, Antalgic  Comments/Distance (ft) 1: Pt ambulated 12 feetx2 with Rw at min A x1 for first attempt and CSx1 for second attempt; unsteadiness noted for first ambulation but improved with second. no buckle. good use of UE support and RLE hopping with maintainence of NWB on LLE  Transfers:  Transfers  Transfer: Yes  Transfer 1  Technique 1: Sit to stand, Stand to sit  Transfer Device 1: Walker  Transfer Level of Assistance 1: Contact guard, Minimum assistance  Trials/Comments 1: pt x3 STS during session - first attempt min A x2 needed for safety as patient used momentum for activity but cued to push from bed and use RLE to push up rather than momentum just  for safety - second attempt of transfer stand by assist x1 and min A x1 from bed; improved balance wit htransfer and bariatric walker used. third attempt: contact guard to close supervision for transfer and balance improved on RLE with UE support. maintained NWB on LLE.    THERAPEUTIC ACTIVITY:  Pt able to stand for 1-2 minutes x2 with UE support on RW and WB on RLE only. Remained stable and tolerated well but fatigues. Pt able to stand to adjust clothing once underwear was placed by PT. Managed to complete hygiene and adjust clothing without assist and balance on RLE and used 1 UE support on bathroom railing. Tolerated well.       Stairs:  Stairs  Stairs: No      Assessment/Plan   Assessment and Plan  PT Assessment Results: Decreased strength, Decreased endurance, Impaired balance, Decreased mobility, Decreased coordination, Obesity, Orthopedic restrictions, Pain  PT Plan: Ongoing PT  Device Issued: Wheeled walker  Recommendations: 24 hour supervision with outpatient PT recommended for continued training on mobility, balance, and strength. Pt maintained NWB on LLE with device during session and completed ADLs without assist, but cued for safety with transfers. Pt refusing [facility] PT at this time for balance and mobility training.  will be home to assist and pt reports she will use knee scooter and wheel chair for mobility.  End of Session Communication: Bedside nurse  End of Session Patient Position: Up in chair (SCds on, call bell in reach, LLE elevated and ice placed)    Encounter Problems       Encounter Problems (Active)       Pain - Adult          Safety       LTG - Patient will adhere to hip precautions during ADL's and transfers       Start:  10/23/24            LTG - Patient will demonstrate safety requirements appropriate to situation/environment       Start:  10/23/24            LTG - Patient will utilize safety techniques       Start:  10/23/24            STG - Patient locks brakes on wheelchair        Start:  10/23/24            STG - Patient uses call light consistently to request assistance with transfers       Start:  10/23/24            STG - Patient uses gait belt during all transfers       Start:  10/23/24            Goal 1       Start:  10/23/24            Goal 2       Start:  10/23/24            Goal 3       Start:  10/23/24                     Education Comments  Educated on hand placement for transfers ( pushing from bed/chair and reaching back for bed/chair when sitting/standing), use of device ( RW, wheel chair, knee scooter ) for safety but recommending RW for ambulation to prevent blood clots and help with stiffness, NWB LLE status until follow up, and to have assist at home for safety       Isaura Rodríguez, PT, DPT

## 2024-10-24 NOTE — PROGRESS NOTES
TCC met with patient at the bedside to discuss discharge plan.  34 yr old female admitted extended recovery following Arthrodesis of the Left Ankle with Dr. Dominguez.  Plan is home with medically cleared. NWB to LLE. Anticipate home when medically cleared with no skilled needs.  OPT likely later in recovery.  Post op follow up on 10/30/24 with Dr. Dominguez   10/24/24 1454   Discharge Planning   Living Arrangements Spouse/significant other;Children   Support Systems Spouse/significant other;Family members   Assistance Needed transportation   Type of Residence Private residence   Number of Stairs to Enter Residence 0   Number of Stairs Within Residence 0   Do you have animals or pets at home? Yes   Type of Animals or Pets 1 dog   Home or Post Acute Services None   Expected Discharge Disposition Home   Does the patient need discharge transport arranged? No   Financial Resource Strain   How hard is it for you to pay for the very basics like food, housing, medical care, and heating? Not hard   Housing Stability   In the last 12 months, was there a time when you were not able to pay the mortgage or rent on time? N   In the past 12 months, how many times have you moved where you were living? 0   At any time in the past 12 months, were you homeless or living in a shelter (including now)? N   Transportation Needs   In the past 12 months, has lack of transportation kept you from medical appointments or from getting medications? no   In the past 12 months, has lack of transportation kept you from meetings, work, or from getting things needed for daily living? No

## 2024-10-24 NOTE — PROGRESS NOTES
Physical Therapy                 Therapy Communication Note    Patient Name: Raine Rothman  MRN: [id number]  Department: [facility]  [facility]  Today's Date: 10/24/2024     Discipline: Physical Therapy      13:37-13:48:    Pt sitting up in recliner at PT arrival. Pt cleared by RN prior to session start. Pt transferred sit to stand min A x1 with second person for min A x1 for safety. Pt maintaining NWB on LLE and use of bariatric RW for activity - use of momentum and Ue support to assist. Once upright, pt stable with WB on RLE. Pt stand to sit on bed and commode at Csx1 using UE support to reachback and maintaining NWB on LLE. Pt had no buckle or LOB with x2 transfers. Pt ambulated 12 feetx1, 12 feetx1 with abe walker maintaining NWB on LLE and hopping on RLE with use of UE support. Cgx1 for first attempt and Csx1 for second attempt with 2nd person for stand by assist. Pt slow with reinaldo but no buckle or LOB. Pt toileted without assist for cleansing and was provided hand wipe for hygiene. Pt sit to supine mod I with use of bed rails. Scds attached and on, call bell in reach, all needs met. Ice on L ankle and RN awrae of location. Alarm on.     Isaura Rodríguez, PT, DPT

## 2024-10-24 NOTE — PROGRESS NOTES
PODIATRIC MEDICINE & SURGERY - PROGRESS NOTE    Subjective   Raine Rothman is a 34 y.o. female who is on day 1 of admission for Arthritis of left ankle.      Interval HPI: Patient resting comfortably in bed. Reports pain is well-controlled. She states that she nerve block has started to wear off, but her pain did improve this morning with medication. Denies current constitutional sx.  No further complaints.     Review Of Systems:  A 10+ point ROS was completed and is negative unless as otherwise noted.    Past Medical History  Past Medical History:   Diagnosis Date    Anxiety May 2022    On zoloft 100mg    Arthritis 2009    Rheumatoid arthritis; enbrel injections 50mg weekly    Autoimmune disorder (Multi) 2009    Rheumatoid arthritis    Chronic pain disorder 2009    Rheumatoid arthritis    Depression May 2022    On zoloft 100mg    Factor V Leiden (Multi) 2008    Not on routine meds; usually get placed on lovenox after procedures    Gestational diabetes March 2021    Resolved after birth    Immunocompromised 2009    Rheumatoid arthritis    Intellectual disability May 2022    On zoloft 100mg daily    Joint pain 2009    Rheumatoid arthritis    Obesity Unsure    Thrombocytopenia (CMS-HCC) May 2021    Resolved; was pregnant     Allergies  Allergies   Allergen Reactions    Infliximab Anaphylaxis    Cefaclor Itching, Rash and Other    Piperacillin-Tazobactam Itching and Rash    Sulfa (Sulfonamide Antibiotics) Rash     Medications  Scheduled medications  cetirizine, 10 mg, oral, Daily  cholecalciferol, 5,000 Units, oral, Daily  clindamycin, 300 mg, oral, q8h JOSELIN  cyanocobalamin, 500 mcg, oral, Daily  enoxaparin, 40 mg, subcutaneous, Daily  folic acid, 1 mg, oral, Daily  magnesium oxide, 400 mg, oral, Daily  sertraline, 100 mg, oral, Daily  traZODone, 100 mg, oral, Nightly      Continuous medications       PRN medications: acetaminophen **OR** acetaminophen **OR** acetaminophen, acetaminophen, benzocaine-menthol,  "HYDROmorphone, lubricating eye drops, melatonin, ondansetron ODT, oxyCODONE, simethicone, sodium chloride    Objective   Visit Vitals  /74 (BP Location: Left arm, Patient Position: Lying)   Pulse 70   Temp 36.1 °C (97 °F) (Temporal)   Resp 16   Ht 1.778 m (5' 10\")   Wt 140 kg (307 lb 15.7 oz)   LMP 09/25/2024   SpO2 100%   BMI 44.19 kg/m²   OB Status Having periods   Smoking Status Former   BSA 2.63 m²       Physical Exam  Constitutional: NAD and AAOx3.   HEENT: Head is normocephalic and atraumatic. External ear is normal B/L. Conjunctivae normal B/L. Nose is normal. Oropharynx is clear. Mouth is moist.   Cardiovascular: Normal rate.  Pulmonary: No increased work of breathing.  Psychological: Appropriate mood and behavior.     FOCUSED LLE EXAMINATION  Vascular: DP and PT pulses are palpable.  CFT is  brisk. Skin temperature is warm to warm. 1+ non-pitting edema.      Neurologic:  Light touch is intact to the foot. The hallux is down going.      Musculoskeletal: Lower extremity muscle strength is appropriate for age and activity level.    Dermatologic: Upon removal of posterior splint, wound vac found to be intact and functioning to incision at anterior ankle. Medial and lateral ankle incisions with sutures intact and scant serosanguinous drainage. Minimal periwound erythema.     Lab Results   Component Value Date    WBC 8.5 10/24/2024    HGB 11.0 (L) 10/24/2024    HCT 33.1 (L) 10/24/2024    MCV 86 10/24/2024     10/24/2024     Lab Results   Component Value Date    GLUCOSE 75 10/09/2024    CALCIUM 9.3 10/09/2024     10/09/2024    K 3.9 10/09/2024    CO2 29 10/09/2024     10/09/2024    BUN 13 10/09/2024    CREATININE 0.66 10/09/2024     Lab Results   Component Value Date    HGBA1C 4.4 04/27/2023     No results found for the last 90 days.      Imaging  XR ankle left 3+ views  Result Date: 10/23/2024  Status post fusion of the tibiotalar joint with a small cortical fracture at the level of the " most superior screw.    Assessment/Plan   Raine Rothman is a 34 y.o. female who is on day 1 of admission for Arthritis of left ankle.     POD #1 s/p left ankle arthrodesis  Rheumatoid arthritis  Factor V Leiden   Peripheral neuropathy    Planned admission for post-operative pain control.   Reviewed vitals labs, imaging and notes.   On exam, wound vac is intact and functioning.   Overall, patient is feeling well today.     PLAN  Continue prophylactic abx with clindamycin   Pain regimen  - tylenol q4h PRN  - oxycodone q6h PRN  - dilaudid 0.2 mg PRN for breakthrough pain - wean as tolerated  VTE prophylaxis   - Lovenox 40 mg subcutaneous daily  - SCD to right lower extremity  Bowel regimen PRN  Zofran PRN for nausea  Strictly NWB to operative extremity. May discontinue bedrest. Out of bed with assistance.    Anticipate discharge to home tomorrow or the following day, pending PT/OT eval and if pain is well-controlled with oral medication.     Will continue to follow closely. Plan for outpatient follow up with Dr. Dominguez as scheduled.     Steffany Ndiaye DPM PGY-2  Podiatric Medicine & Surgery

## 2024-10-24 NOTE — NURSING NOTE
Vitals:    10/24/24 1511   BP: 121/64   Pulse: 71   Resp: 16   Temp: 36.2 °C (97.2 °F)   SpO2: 100%      Patient lying in bed. Vital Is stable. All needs met at this moment. Call light within reach. Bed at lowest position. Continue monitor.      Helped patient get up to bathroom with walker . Tolerated well.

## 2024-10-25 ENCOUNTER — PHARMACY VISIT (OUTPATIENT)
Dept: PHARMACY | Facility: CLINIC | Age: 34
End: 2024-10-25
Payer: COMMERCIAL

## 2024-10-25 VITALS
HEART RATE: 83 BPM | RESPIRATION RATE: 16 BRPM | WEIGHT: 293 LBS | BODY MASS INDEX: 41.95 KG/M2 | TEMPERATURE: 97.2 F | DIASTOLIC BLOOD PRESSURE: 65 MMHG | HEIGHT: 70 IN | OXYGEN SATURATION: 100 % | SYSTOLIC BLOOD PRESSURE: 127 MMHG

## 2024-10-25 LAB
ERYTHROCYTE [DISTWIDTH] IN BLOOD BY AUTOMATED COUNT: 13.6 % (ref 11.5–14.5)
HCT VFR BLD AUTO: 35.8 % (ref 36–46)
HGB BLD-MCNC: 11.8 G/DL (ref 12–16)
MCH RBC QN AUTO: 28.9 PG (ref 26–34)
MCHC RBC AUTO-ENTMCNC: 33 G/DL (ref 32–36)
MCV RBC AUTO: 88 FL (ref 80–100)
NRBC BLD-RTO: ABNORMAL /100{WBCS}
PLATELET # BLD AUTO: 168 X10*3/UL (ref 150–450)
RBC # BLD AUTO: 4.08 X10*6/UL (ref 4–5.2)
WBC # BLD AUTO: 6.7 X10*3/UL (ref 4.4–11.3)

## 2024-10-25 PROCEDURE — 36415 COLL VENOUS BLD VENIPUNCTURE: CPT | Performed by: STUDENT IN AN ORGANIZED HEALTH CARE EDUCATION/TRAINING PROGRAM

## 2024-10-25 PROCEDURE — 2500000001 HC RX 250 WO HCPCS SELF ADMINISTERED DRUGS (ALT 637 FOR MEDICARE OP): Performed by: STUDENT IN AN ORGANIZED HEALTH CARE EDUCATION/TRAINING PROGRAM

## 2024-10-25 PROCEDURE — 2500000002 HC RX 250 W HCPCS SELF ADMINISTERED DRUGS (ALT 637 FOR MEDICARE OP, ALT 636 FOR OP/ED)

## 2024-10-25 PROCEDURE — 7100000011 HC EXTENDED STAY RECOVERY HOURLY - NURSING UNIT

## 2024-10-25 PROCEDURE — 85027 COMPLETE CBC AUTOMATED: CPT | Performed by: STUDENT IN AN ORGANIZED HEALTH CARE EDUCATION/TRAINING PROGRAM

## 2024-10-25 PROCEDURE — 99221 1ST HOSP IP/OBS SF/LOW 40: CPT | Performed by: EMERGENCY MEDICINE

## 2024-10-25 PROCEDURE — 2500000004 HC RX 250 GENERAL PHARMACY W/ HCPCS (ALT 636 FOR OP/ED)

## 2024-10-25 PROCEDURE — RXMED WILLOW AMBULATORY MEDICATION CHARGE

## 2024-10-25 PROCEDURE — 2500000004 HC RX 250 GENERAL PHARMACY W/ HCPCS (ALT 636 FOR OP/ED): Performed by: STUDENT IN AN ORGANIZED HEALTH CARE EDUCATION/TRAINING PROGRAM

## 2024-10-25 PROCEDURE — 96372 THER/PROPH/DIAG INJ SC/IM: CPT | Performed by: STUDENT IN AN ORGANIZED HEALTH CARE EDUCATION/TRAINING PROGRAM

## 2024-10-25 PROCEDURE — 2500000001 HC RX 250 WO HCPCS SELF ADMINISTERED DRUGS (ALT 637 FOR MEDICARE OP)

## 2024-10-25 RX ORDER — LANOLIN ALCOHOL/MO/W.PET/CERES
400 CREAM (GRAM) TOPICAL DAILY
Qty: 30 TABLET | Refills: 0 | Status: CANCELLED | OUTPATIENT
Start: 2024-10-26 | End: 2024-11-25

## 2024-10-25 RX ORDER — OXYCODONE HYDROCHLORIDE 5 MG/1
5 TABLET ORAL EVERY 6 HOURS PRN
Qty: 15 TABLET | Refills: 0 | Status: CANCELLED | OUTPATIENT
Start: 2024-10-25

## 2024-10-25 RX ORDER — ENOXAPARIN SODIUM 100 MG/ML
40 INJECTION SUBCUTANEOUS DAILY
Qty: 21 EACH | Refills: 0 | Status: SHIPPED | OUTPATIENT
Start: 2024-10-25 | End: 2024-11-16

## 2024-10-25 RX ORDER — CLINDAMYCIN HYDROCHLORIDE 300 MG/1
300 CAPSULE ORAL EVERY 8 HOURS SCHEDULED
Qty: 24 CAPSULE | Refills: 0 | Status: CANCELLED | OUTPATIENT
Start: 2024-10-25 | End: 2024-11-02

## 2024-10-25 RX ORDER — OXYCODONE HYDROCHLORIDE 5 MG/1
5 TABLET ORAL EVERY 6 HOURS PRN
Qty: 12 TABLET | Refills: 0 | Status: SHIPPED | OUTPATIENT
Start: 2024-10-25 | End: 2024-10-28

## 2024-10-25 RX ORDER — NAPROXEN SODIUM 220 MG/1
81 TABLET, FILM COATED ORAL 2 TIMES DAILY
Qty: 90 TABLET | Refills: 0 | Status: SHIPPED | OUTPATIENT
Start: 2024-10-25 | End: 2024-10-25 | Stop reason: HOSPADM

## 2024-10-25 RX ORDER — CLINDAMYCIN HYDROCHLORIDE 300 MG/1
300 CAPSULE ORAL EVERY 8 HOURS SCHEDULED
Qty: 24 CAPSULE | Refills: 0 | Status: SHIPPED | OUTPATIENT
Start: 2024-10-25 | End: 2024-11-02

## 2024-10-25 ASSESSMENT — COGNITIVE AND FUNCTIONAL STATUS - GENERAL
DAILY ACTIVITIY SCORE: 21
DRESSING REGULAR LOWER BODY CLOTHING: A LITTLE
CLIMB 3 TO 5 STEPS WITH RAILING: A LOT
WALKING IN HOSPITAL ROOM: A LITTLE
MOBILITY SCORE: 18
MOVING TO AND FROM BED TO CHAIR: A LITTLE
TOILETING: A LITTLE
HELP NEEDED FOR BATHING: A LITTLE
STANDING UP FROM CHAIR USING ARMS: A LITTLE
MOVING FROM LYING ON BACK TO SITTING ON SIDE OF FLAT BED WITH BEDRAILS: A LITTLE

## 2024-10-25 ASSESSMENT — PAIN SCALES - GENERAL
PAINLEVEL_OUTOF10: 0 - NO PAIN
PAINLEVEL_OUTOF10: 5 - MODERATE PAIN
PAINLEVEL_OUTOF10: 7
PAINLEVEL_OUTOF10: 2
PAINLEVEL_OUTOF10: 3

## 2024-10-25 ASSESSMENT — PAIN - FUNCTIONAL ASSESSMENT
PAIN_FUNCTIONAL_ASSESSMENT: 0-10

## 2024-10-25 ASSESSMENT — PAIN DESCRIPTION - LOCATION: LOCATION: ANKLE

## 2024-10-25 ASSESSMENT — PAIN SCALES - WONG BAKER: WONGBAKER_NUMERICALRESPONSE: NO HURT

## 2024-10-25 ASSESSMENT — PAIN DESCRIPTION - ORIENTATION: ORIENTATION: LEFT

## 2024-10-25 ASSESSMENT — PAIN DESCRIPTION - DESCRIPTORS: DESCRIPTORS: ACHING

## 2024-10-25 NOTE — CONSULTS
Consults    Reason For Consult  34-year-old female medical management requested for autoimmune disorder factor V Leiden rheumatoid arthritis and anxiety    History Of Present Illness  Raine Rothman is a 34 y.o. female presenting with postop left ankle arthrodesis  Patient has remained hemodynamically     Past Medical History  She has a past medical history of Anxiety (May 2022), Arthritis (2009), Autoimmune disorder (Multi) (2009), Chronic pain disorder (2009), Depression (May 2022), Factor V Leiden (Multi) (2008), Gestational diabetes (March 2021), Immunocompromised (2009), Intellectual disability (May 2022), Joint pain (2009), Obesity (Unsure), and Thrombocytopenia (CMS-HCC) (May 2021).    She has no past medical history of ADD (attention deficit disorder), ADHD (attention deficit hyperactivity disorder), HARRIET (acute kidney injury) (CMS-HCC), Autism ([facility]), Biliary disease, Bipolar disorder, Bladder cancer (Multi), BPH (benign prostatic hyperplasia), Cancer of neurologic system ([facility]), Celiac disease ([facility]), Cerebral aneurysm ([facility]), Cerebral palsy, Cerebral vascular accident (Multi), Cervical cancer, Cervical disc disease, Cholelithiasis, Chronic headaches, Chronic kidney disease, Cirrhosis (Multi), CKD (chronic kidney disease), Cognitive decline, Cognitive disorder, Colon polyp, Colorectal cancer (Multi), Crohn's disease (Multi), Dementia, Diverticulosis, Dizziness, Dysfunctional uterine bleeding, Dysphagia, Endometrial cancer (Multi), Esophageal cancer (Multi), Esophageal disease, ESRD (end stage renal disease) (Multi), Fibroid, Fibromyalgia, primary, Fractures, Gastric cancer (Multi), Gender dysphoria, GERD (gastroesophageal reflux disease), Gestational hypertension ([facility]), GI (gastrointestinal bleed), Hemodialysis status (CMS-HCC), Hernia, internal, History of peritoneal dialysis, HIV disease (Multi), Hydrocephalus, Irritable bowel syndrome, Liver disease, Lumbar disc disease, Lupus, Mastocytosis,  MS (multiple sclerosis) (Multi), Muscular dystrophy (Multi), Myasthenia gravis, Myoneural disorder (Multi), Myotonia, LAIRD (nonalcoholic steatohepatitis), Nephrolithiasis, Neuromuscular disorder (Multi), Ovarian cancer (Multi), Pancreatic cancer (Multi), Pancreatitis (HHS-HCC), Pelvic mass, Peptic ulcer disease, Peripheral neuropathy, Personal history of other mental and behavioral disorders, Polycystic ovarian disease, Pregnant (HHS-HCC), Prematurity (HHS-HCC), Prostate cancer (Multi), PTSD (post-traumatic stress disorder), Pyelonephritis, Renal cancer (Multi), Renal disease due to hypertension, Schizophrenia, Scoliosis, Seizure disorder (Multi), Spinal stenosis, Substance addiction (Multi), Syncope, Thoracic spinal cord injury (Multi), TIA (transient ischemic attack), Ulcerative colitis, Urinary tract infection, Uterine cancer (Multi), or Vertigo.    Surgical History  She has a past surgical history that includes Other surgical history (2009 and );  section, low transverse (2021); Tympanoplasty (2 years old); Colposcopy (At 22 years old); Adenoidectomy (At 2 years old); and Tonsillectomy (2 years old).     Social History  She reports that she quit smoking about 9 years ago. Her smoking use included cigarettes. She has never used smokeless tobacco. She reports current alcohol use of about 1.0 standard drink of alcohol per week. She reports that she does not use drugs.    Family History  Family History   Problem Relation Name Age of Onset    Hypertension Mother Angi Grullon     Cancer Father Tiago mitchel sr-small cell lung cancer     Arthritis Maternal Grandfather Torres jaquelin     Stroke Maternal Grandmother Liliana hammer     Depression Brother Tiago Mitchel         Allergies  Infliximab, Cefaclor, Piperacillin-tazobactam, and Sulfa (sulfonamide antibiotics)    Review of Systems  Noncontributory   Physical Exam  Patient is alert in no acute distress states pain is well controlled  Lungs  are clear to auscultation and percussion  Cardiac is regular rate and rhythm normal S1-S2 without murmur gallop rub click S3 or S4  Abdomen is soft nontender positive bowel sounds there is no hepatosplenomegaly or CVA tenderness appreciated  Extremities without cyanosis clubbing erythema or edema  Left leg with dressing dry and intact Hemovac in place patient can move toes excellent capillary refill     Last Recorded Vitals  /65 (BP Location: Left arm, Patient Position: Lying)   Pulse 83   Temp 36.2 °C (97.2 °F) (Temporal)   Resp 16   Wt 140 kg (307 lb 15.7 oz)   SpO2 100%     Relevant Results  Scheduled medications  cetirizine, 10 mg, oral, Daily  cholecalciferol, 5,000 Units, oral, Daily  clindamycin, 300 mg, oral, q8h JOSELIN  cyanocobalamin, 500 mcg, oral, Daily  enoxaparin, 40 mg, subcutaneous, Daily  folic acid, 1 mg, oral, Daily  magnesium oxide, 400 mg, oral, Daily  sertraline, 100 mg, oral, Daily  traZODone, 100 mg, oral, Nightly      Continuous medications     PRN medications  PRN medications: acetaminophen **OR** acetaminophen **OR** acetaminophen, acetaminophen, benzocaine-menthol, HYDROmorphone, lubricating eye drops, melatonin, ondansetron ODT, oxyCODONE, simethicone, sodium chloride    Results for orders placed or performed during the hospital encounter of 10/23/24 (from the past 24 hours)   CBC   Result Value Ref Range    WBC 6.7 4.4 - 11.3 x10*3/uL    nRBC      RBC 4.08 4.00 - 5.20 x10*6/uL    Hemoglobin 11.8 (L) 12.0 - 16.0 g/dL    Hematocrit 35.8 (L) 36.0 - 46.0 %    MCV 88 80 - 100 fL    MCH 28.9 26.0 - 34.0 pg    MCHC 33.0 32.0 - 36.0 g/dL    RDW 13.6 11.5 - 14.5 %    Platelets 168 150 - 450 x10*3/uL        Assessment/Plan   Rheumatoid arthritis, left ankle  Management per primary  PT/OT  Pain control, bowel regimen  Antibiotic and DVT prophylaxis per primary  Depression  Continue sertraline, trazodone  Factor V Leiden  Lovenox ppx               Or aspirin 81 mg p.o. twice daily for 45  days    Patient medically stable for discharge    Katerin Harrell MD

## 2024-10-25 NOTE — NURSING NOTE
Vitals:    10/25/24 1058   BP: 127/65   Pulse: 83   Resp: 16   Temp: 36.2 °C (97.2 °F)   SpO2: 100%      Patient lying in bed. Vital Is stable. All needs met at this moment. Call light within reach. Bed at lowest position. Continue monitor.

## 2024-10-25 NOTE — NURSING NOTE
Patient is sleeping soundly in bed.  Breathing even and unlabored. Head elevated, left leg elevated with pillows. Woundvac to surgical site on and intact.

## 2024-10-25 NOTE — NURSING NOTE
Assumed care of patient.  Meds and labs reviewed. Patient is awake in bed during bedside shift report. Pain meds given at this time. Patient was repositioned in bed with her left leg elevated with pillows. No other c/o voiced at this time.

## 2024-10-25 NOTE — SIGNIFICANT EVENT
PODIATRIC MEDICINE & SURGERY  Patient: Raine Rothman    : 1990     MRN: [id number]  DOS: 10/25/24    Patient is clear for discharge from a podiatry standpoint.   Discharge orders entered for prophylactic abx, pain medication and VTE prophylaxis.   Remain non-weightbearing to operative extremity.   Patient to keep dressing clean and dry until follow up appointment.     Follow up with Dr. Dominguez at the [facility] as scheduled.     Steffany Ndiaye DPM PGY-2

## 2024-10-25 NOTE — PROGRESS NOTES
Medication Education     Medication education for Raine Rothman was provided to the patient  for the following medication(s):  Aspirin  Oxycodone  Clindamycin    Discussed Lovenox, which will be picked up by patient after discharge      Medication education provided by a Pharmacist:  Dose, frequency, storage Proper dose, indication, possible ADRs Refilling the medication  How the medication works and benefits of taking it Benefits of taking the medication  Importance of compliance    Identified potential barriers to education:  None    Method(s) of Education:  Verbal Written materials provided and reviewed    An opportunity to ask questions and receive answers was provided.     Assessment of understanding the patient :  2= meets goals/outcomes    Additional Notes (if applicable):     M2B delivered.    Raza Arceo, PharmD

## 2024-10-25 NOTE — NURSING NOTE
Vitals:    10/25/24 0711   BP: 130/62   Pulse: 73   Resp: 16   Temp: 36.1 °C (97 °F)   SpO2: 99%      Patient lying in bed. Vital Is stable. All needs met at this moment. Call light within reach. Bed at lowest position. Continue monitor.

## 2024-10-25 NOTE — NURSING NOTE
Vitals:    10/24/24 1935   BP: 131/71   Pulse: 80   Resp: 16   Temp: 36.3 °C (97.3 °F)   SpO2: 100%      Patient lying in bed. Vital Is stable. All needs met at this moment. Call light within reach. Bed at lowest position. Continue monitor.

## 2024-10-26 ENCOUNTER — PHARMACY VISIT (OUTPATIENT)
Dept: PHARMACY | Facility: CLINIC | Age: 34
End: 2024-10-26
Payer: COMMERCIAL

## 2024-10-26 PROCEDURE — RXMED WILLOW AMBULATORY MEDICATION CHARGE

## 2024-11-25 NOTE — DISCHARGE SUMMARY
Discharge Diagnosis  Arthritis of left ankle    Issues Requiring Follow-Up  Keep follow-up appointments and take medication as directed  Discharge Meds     Medication List      CONTINUE taking these medications     acetaminophen 500 mg tablet; Commonly known as: Tylenol   cetirizine 10 mg tablet; Commonly known as: ZyrTEC   cholecalciferol 5,000 Units tablet; Commonly known as: Vitamin D-3   cyanocobalamin 500 mcg tablet; Commonly known as: Vitamin B-12   EnbreL SureClick 50 mg/mL (1 mL) pen injector pen injection; Generic   drug: etanercept   folic acid 1 mg tablet; Commonly known as: Folvite   magnesium oxide 500 mg capsule   sertraline 100 mg tablet; Commonly known as: Zoloft   traZODone 100 mg tablet; Commonly known as: Desyrel     STOP taking these medications     chlorhexidine 0.12 % solution; Commonly known as: Peridex     ASK your doctor about these medications     clindamycin 300 mg capsule; Commonly known as: Cleocin; Take 1 capsule   (300 mg) by mouth every 8 hours for 24 doses.; Ask about: Should I take   this medication?   enoxaparin 40 mg/0.4 mL syringe; Commonly known as: Lovenox; Inject 0.4   mL (40 mg) under the skin once daily for 21 days.; Ask about: Should I   take this medication?   oxyCODONE 5 mg immediate release tablet; Commonly known as: Roxicodone;   Take 1 tablet (5 mg) by mouth every 6 hours if needed for severe pain (7 -   10) for up to 3 days.; Ask about: Should I take this medication?       Test Results Pending At Discharge  Pending Labs       No current pending labs.            Hospital Course   34-year-old female with history of rheumatoid arthritis was admitted for surgery on left ankle patient had arthrodesis of the ankle; she did well postoperatively being followed by Dr. Dominguez and was discharged home in stable and improved condition    Pertinent Physical Exam At Time of Discharge  Physical Exam  Patient alert in no acute distress  Lungs are clear  Cardiac is regular rate and  rhythm  Abdomen is benign  Extremities are significant for left ankle with dressing dry and intact extremities warm pulses are intact  Outpatient Follow-Up  No future appointments.      Katerin Harrell MD

## 2024-12-20 ENCOUNTER — EVALUATION (OUTPATIENT)
Dept: PHYSICAL THERAPY | Facility: CLINIC | Age: 34
End: 2024-12-20
Payer: COMMERCIAL

## 2024-12-20 DIAGNOSIS — M12.872: Primary | ICD-10-CM

## 2024-12-20 PROCEDURE — 97110 THERAPEUTIC EXERCISES: CPT | Mod: GP

## 2024-12-20 PROCEDURE — 97162 PT EVAL MOD COMPLEX 30 MIN: CPT | Mod: GP

## 2024-12-20 SDOH — ECONOMIC STABILITY: GENERAL: QUALITY OF LIFE: FAIR

## 2024-12-20 ASSESSMENT — ACTIVITIES OF DAILY LIVING (ADL)
AMBULATION_WITHOUT_ASSISTIVE_DEVICE: UNABLE
POOR_BALANCE: 1
AMBULATION_WITH_ASSISTIVE_DEVICE: INDEPENDENT

## 2024-12-20 ASSESSMENT — ENCOUNTER SYMPTOMS
QUALITY: DULL ACHE
EXACERBATED BY: MOVEMENT
ALLEVIATING FACTORS: SUPPORT
ALLEVIATING FACTORS: MEDICATIONS
PAIN SCALE: 3
PAIN SCALE AT LOWEST: 2
QUALITY: DISCOMFORT
PAIN SCALE AT HIGHEST: 8
LOSS OF SENSATION IN FEET: 0
QUALITY: THROBBING
OCCASIONAL FEELINGS OF UNSTEADINESS: 0
QUALITY: TIGHT
PAIN LOCATION: LEFT ANKLE
DEPRESSION: 0

## 2024-12-20 NOTE — PROGRESS NOTES
"Physical Therapy Evaluation and Treatment     Patient Name: Raine Rothman  MRN: [id number]  Encounter date: 2024  Time Calculation  Start Time: 930  Stop Time: 1015  Time Calculation (min): 45 min    Visit # 1 of 15 (60visits)  Visits/Dates Authorized: KANWALNA - NO AUTH / 100% COVERAGE thru 24 / $750 DEDUCT MET / $3500 OOP MET / [employer] 25: 80% Coins / 60V pt/ot/st - 0 used / PER [employer] w/s / REF: [id number] / ds 24.     Current Problem:   Problem List Items Addressed This Visit    None  Visit Diagnoses         Codes    Oth specific arthropathies, NEC, left ankle and foot    -  Primary M12.872    Relevant Orders    Follow Up In Physical Therapy          Precautions:  \"Absolutely NO range of motion to Left ankle joint- Per MD\"  PT to address gait training and progression of WB  (Non-WB..partial WB..full WB - per MD).        Subjective Evaluation    History of Present Illness  Date of onset: 10/23/2024  Date of surgery: 10/23/2024  Mechanism of injury: Left ankle fusion 10/23/24 - S/P Left ankle arthrodesis  \"Plates/screws\"  History left ankle OA, RA, Cellulitis, sepsis   \"Absolutely NO range of motion to Left ankle joint- Per MD\"  PT to address gait training and progression of WB  (Non-WB..partial WB..full WB - per MD).  Patient receives laser light therapy 2x/wk - Dr. Alberto mohan only removed for showering and applying lotion to left leg and ankle    Quality of life: fair    Pain  Current pain rating: 3  At best pain ratin  At worst pain ratin  Location: Left ankle  Quality: dull ache, throbbing, tight and discomfort  Relieving factors: support and medications  Aggravating factors: movement    Social Support  Lives in: one-story house  Lives with: spouse and young children    Hand dominance: right    Diagnostic Tests  X-ray: abnormal    Treatments  Previous treatment: physical therapy, medication, immobilization, massage and injection treatment  Current treatment: immobilization and " medication  Patient Goals  Patient goals for therapy: decreased edema, decreased pain, improved balance, increased motion, increased strength, independence with ADLs/IADLs and return to sport/leisure activities  Patient goal: Works from home - 40hrs/wk          Objective     Observations   Left Ankle/Foot   Positive for atrophy, deformity, edema and incision.     Additional Observation Details  Patient in CAM boot -     Neurological Testing     Sensation     Ankle/Foot   Left Ankle/Foot   Diminished: light touch, sharp/dull discrimination, static two point discrimination, dynamic two point discrimination, hot/cold discrimination and proprioception    Reflexes   Left   Patellar (L4): normal (2+)    Active Range of Motion     Right Ankle/Foot   Normal active range of motion    Additional Active Range of Motion Details  No AROM or PROM left annkle    Joint Play     Right Ankle/Foot  Joints within functional limits are the fibular head, proximal tibiofibular joint, distal tibiofibular joint, talocrural joint, subtalar joint, midfoot and forefoot.     Strength/Myotome Testing     Left Ankle/Foot   Dorsiflexion: 0  Plantar flexion: 0  Inversion: 0  Eversion: 0  Great toe flexion: 0  Great toe extension: 0    Right Ankle/Foot   Dorsiflexion: WFL.   Plantar flexion: WFL.   Inversion: WFL.   Eversion: WFL.   Great toe flexion: WFL.   Great toe extension: WFL.     Additional Strength Details  No active mvt Left ankle - per MD    Ambulation   Weight-Bearing Status   Weight-Bearing Status (Left): partial weight-bearing   Assistive device used: underarm crutches    Additional Weight-Bearing Status Details  CAM boot walker Left foot/ankle    Ambulation: Level Surfaces   Ambulation with assistive device: independent  Ambulation without assistive device: unable    Observational Gait   Gait: asymmetric and circumduction   Decreased walking speed, stride length, left stance time, left swing time and left step length.   Left foot  contact pattern: foot flat  Left arm swing: decreased  Base of support: increased    General Comments     Ankle/Foot Comments   Left foot.ankle incisions healing well  Negative drainage          Treatments:     Therapeutic Exercise 81771:     HEP / Access Codes:   Access Code: [email or web address]  URL: [email or web address]  Date: 12/20/2024  Prepared by: Syed Long    Exercises  - Clamshell  - 1 x daily - 7 x weekly - 2 sets - 10 reps - 1 hold  - Seated Long Arc Quad  - 1 x daily - 7 x weekly - 2 sets - 20 reps - 1 hold  - Sidelying Hip Abduction  - 1 x daily - 7 x weekly - 2 sets - 10 reps - 1 hold  - Supine Quad Set  - 1 x daily - 7 x weekly - 2 sets - 10 reps - 5 hold  - Seated Hamstring Stretch  - 1 x daily - 7 x weekly - 2 sets - 3 reps - 5 hold  - Supine Hip Adduction Isometric with Ball  - 1 x daily - 7 x weekly - 2 sets - 10 reps - 3 hold  - Small Range Straight Leg Raise  - 1 x daily - 7 x weekly - 2 sets - 10 reps - 1 hold  - Supine Gluteal Sets  - 1 x daily - 7 x weekly - 2 sets - 10 reps - 5 hold    Assessment & Plan     Assessment  Impairments: abnormal coordination, abnormal gait, abnormal muscle tone, abnormal or restricted ROM, activity intolerance, impaired balance, impaired physical strength, lacks appropriate home exercise program, pain with function, safety issue and weight-bearing intolerance  Prognosis: good    Goals  Works from home - 40hrs/wk    Plan  Therapy options: will be seen for skilled physical therapy services  Planned therapy interventions: balance/weight-bearing training, ADL retraining, body mechanics training, flexibility, functional ROM exercises, gait training, home exercise program, strengthening, stretching and therapeutic activities  Frequency: 2x week  Duration in visits: 10  Duration in weeks: 5  Treatment plan discussed with: patient       Moderate complexity due to patient's clinical presentation being evolving with changing characteristics, with  "comorbidities/complexities to include Limited WB left LE, all of which may negatively impact rehab tolerance and progression.     SUMMARY - Precautions:  \"Absolutely NO range of motion to Left ankle joint- Per MD\"  PT to address gait training and progression of WB  (Non-WB..partial WB..full WB - per MD).    PT - initiate ROM/PRE's hips/knee,Right ankle - Non-WB  Progress gait/WB per MD orders       Goals:   Frequency - 2x/wk x 6-8 wks    Goals:  SHORT TERM GOALS:  Patient will report decrease in pain from 8/10 to </= 1/10 to improve quality of life.   Patient will improve Fused ankle Left AROM to WFL in order to improve gait mechanics and functional mobility  Patient will demonstrate sit to stand x10 without UE to demonstrate improved LE strength.  Patient independent with prescribed HEP  Pt Education - Independent postural and  awareness and joint protection program  LONG TERM GOALS:  Patient will be independent with HEP to promote self management of condition  Patient will perform reciprocal stair negotiation to demonstrate improved LE strength.   Patient will ambulate with least restrictive device and proper gait mechanics to promote return to prior level of function.    Functional Level - reported % (hobbies/work/recreation)      "

## 2024-12-23 ENCOUNTER — TREATMENT (OUTPATIENT)
Dept: PHYSICAL THERAPY | Facility: CLINIC | Age: 34
End: 2024-12-23
Payer: COMMERCIAL

## 2024-12-23 DIAGNOSIS — M12.872: ICD-10-CM

## 2024-12-23 PROCEDURE — 97110 THERAPEUTIC EXERCISES: CPT | Mod: GP

## 2024-12-23 NOTE — PROGRESS NOTES
"Physical Therapy Treatment    Patient Name: Raine Rothman  MRN: [id number]  Today's Date: 12/23/2024  Time Calculation  Start Time: 0900  Stop Time: 0945  Time Calculation (min): 45 min    Insurance:  Visit number: 2 of 15  Authorization info: [employer] - NO AUTH / 100% COVERAGE thru 12/31/24 / $750 DEDUCT MET / $3500 OOP MET / [employer] 1/1/25: 80% Coins / 60V pt/ot/st - 0 used / PER [employer] w/s / REF: [id number] / ds 12/19/24.   Insurance Type: Payor: [employer] / Plan: [employer] HEALTH PLAN / Product Type: *No Product type* /     Current Problem   1. Oth specific arthropathies, NEC, left ankle and foot  Follow Up In Physical Therapy          Subjective   General   \"Getting Better\"  Next MD appt - 1/3/25  (Laser therapy 2x/wk)  Reason for Referral: Left ankle fusion  Referred By: Alberto  Precautions: Precautions:  \"Absolutely NO range of motion to Left ankle joint- Per MD\"  PT to address gait training and progression of WB  (Non-WB..partial WB..full WB - per MD).     Pain 0/10     Post Treatment Pain Level -0/10    Objective     Treatments:  Therapeutic Exercise:  Therapeutic Exercise  Therapeutic Exercise Performed: Yes  Therapeutic Exercise Activity 1: seated SAQ - R/L x 2min  Therapeutic Exercise Activity 2: supine SLR x 2min left/Right  Therapeutic Exercise Activity 3: supine ADD with PPT x2min  Therapeutic Exercise Activity 4: supine ADD with bridge x 2min  Therapeutic Exercise Activity 5: supine ABD (GTB) x 2min  Therapeutic Exercise Activity 6: SL hip ABD R/L x 1min  Therapeutic Exercise Activity 7: PB - marcing 2x60\"  Therapeutic Exercise Activity 8: PB - Hip ABD 2x10 R/L  Therapeutic Exercise Activity 9: PB - partial squats 2x10  Therapeutic Exercise Activity 10: PB - B/L UE support - FWD GAit x 6       Assessment   Assessment:    Impairments: abnormal coordination, abnormal gait, abnormal muscle tone, abnormal or restricted ROM, activity intolerance, impaired balance, impaired physical strength, lacks appropriate home " "exercise program, pain with function, safety issue and weight-bearing intolerance  Prognosis: good  Patient demonstrates good progress toward functional and objective goals set in the plan of care. The patient demonstrates improvements in range of motion and increased tolerance for there ex today. No complaints    Goals  Works from home - 40hrs/wk     Plan  Therapy options: will be seen for skilled physical therapy services  Planned therapy interventions: balance/weight-bearing training, ADL retraining, body mechanics training, flexibility, functional ROM exercises, gait training, home exercise program, strengthening, stretching and therapeutic activities  Frequency: 2x week  Duration in visits: 10  Duration in weeks: 5  Treatment plan discussed with: patient        Moderate complexity due to patient's clinical presentation being evolving with changing characteristics, with comorbidities/complexities to include Limited WB left LE, all of which may negatively impact rehab tolerance and progression.      SUMMARY - Precautions:  \"Absolutely NO range of motion to Left ankle joint- Per MD\"  PT to address gait training and progression of WB  (Non-WB..partial WB..full WB - per MD).     PT - initiate ROM/PRE's hips/knee,Right ankle - Non-WB  Progress gait/WB per MD orders     Goals:   Frequency - 2x/wk x 6-8 wks     Goals:  SHORT TERM GOALS:  Patient will report decrease in pain from 8/10 to </= 1/10 to improve quality of life.   Patient will improve Fused ankle Left AROM to WFL in order to improve gait mechanics and functional mobility  Patient will demonstrate sit to stand x10 without UE to demonstrate improved LE strength.  Patient independent with prescribed HEP  Pt Education - Independent postural and  awareness and joint protection program  LONG TERM GOALS:  Patient will be independent with HEP to promote self management of condition  Patient will perform reciprocal stair negotiation to demonstrate " improved LE strength.   Patient will ambulate with least restrictive device and proper gait mechanics to promote return to prior level of function.    Functional Level - reported % (hobbies/work/recreation)     Plan: Cont MD/PT POC

## 2024-12-30 ENCOUNTER — TREATMENT (OUTPATIENT)
Dept: PHYSICAL THERAPY | Facility: CLINIC | Age: 34
End: 2024-12-30
Payer: COMMERCIAL

## 2024-12-30 DIAGNOSIS — M12.872: ICD-10-CM

## 2024-12-30 PROCEDURE — 97110 THERAPEUTIC EXERCISES: CPT | Mod: GP,CQ

## 2024-12-30 ASSESSMENT — PAIN SCALES - GENERAL: PAINLEVEL_OUTOF10: 0 - NO PAIN

## 2024-12-30 NOTE — PROGRESS NOTES
Physical Therapy Treatment    Patient Name: Raine Rothman  MRN: [id number]  Today's Date: 12/30/2024  Time Calculation  Start Time: 1517  Stop Time: 1601  Time Calculation (min): 44 min  PT Therapeutic Procedures Time Entry  Therapeutic Exercise Time Entry: 41    Insurance:  Visit number: 3 of 15  Authorization info: [employer] - NO AUTH / 100% COVERAGE thru 12/31/24 / $750 DEDUCT MET / $3500 OOP MET / [employer] 1/1/25: 80% Coins / 60V pt/ot/st - 0 used / PER [employer]IG[employer] w/s / REF: [id number] / ds 12/19/24.   Insurance Type: Payor: [employer] / Plan: [employer] HEALTH PLAN / Product Type: *No Product type* /     Current Problem   1. Oth specific arthropathies, NEC, left ankle and foot  Follow Up In Physical Therapy          Subjective   General   Reason for Referral: Left ankle fusion  Referred By: Ayah Dominguez DPM  General Comment: Pt reports no pain on arrival this visit.  Precautions:  Precautions  LE Weight Bearing Status: Left Partial Weight Bearing  Pain   0-10 (Numeric) Pain Score: 0 - No pain  Post Treatment Pain Level 0/10    Objective   Pt safely manages L LE PWB status.    Treatments:  Therapeutic Exercise:  Therapeutic Exercise  Therapeutic Exercise Performed: Yes  Therapeutic Exercise Activity 1: Nustep L5, 6'  Therapeutic Exercise Activity 2: PWB Minisquats 2x10  Therapeutic Exercise Activity 3: PWB standing hip abduction, L4 BTB, 2x10 L/R  Therapeutic Exercise Activity 4: PWB MIP with L4 BTB 2x10 L/R each  Therapeutic Exercise Activity 5: LAQs 2x15 L/R each  Therapeutic Exercise Activity 6: supine SLR 2x10 L/R each  Therapeutic Exercise Activity 7: Isometric hip ADD with abdominal brace 2x10  Therapeutic Exercise Activity 8: Isometric bridge with hip adduction 2x10  Therapeutic Exercise Activity 9: Isometric hip abduction witgh L3 BTB 2x10  Therapeutic Exercise Activity 10: SL hip ABD R/L x10 each     Assessment   Assessment:   PT Assessment  PT Assessment Results:  (abnormal coordination, abnormal gait, abnormal muscle  tone, abnormal or restricted ROM, activity intolerance, impaired balance, impaired physical strength, lacks appropriate HEP, pain with function, safety issue and weight-bearing intolerance)  Rehab Prognosis: Good  Evaluation/Treatment Tolerance: Patient tolerated treatment well  Assessment Comment: Pt tolerates ther ex activities without exacerbation of L foot pain S/S, utilizes RW and //bars to maintain PWB during standing activites.    Plan:   OP PT Plan  Treatment/Interventions:  (balance/weight-bearing training, ADL retraining, body mechanics training, flexibility, functional ROM exercises, gait training, home exercise program, strengthening, stretching and therapeutic activities)  PT Plan: Skilled PT  PT Frequency: 2 times per week  Duration: 5 weeks  Number of Treatments Authorized: 10  Rehab Potential: Good  Plan of Care Agreement: Patient    OP EDUCATION:  Outpatient Education  Individual(s) Educated: Patient  Education Provided: Anatomy, Body Mechanics  Patient/Caregiver Demonstrated Understanding: yes  Patient Response to Education: Patient/Caregiver Verbalized Understanding of Information, Patient/Caregiver Performed Return Demonstration of Exercises/Activities, Patient/Caregiver Asked Appropriate Questions    Goals:     SHORT TERM GOALS:  Patient will report decrease in pain from 8/10 to </= 1/10 to improve quality of life.   Patient will improve Fused ankle Left AROM to WFL in order to improve gait mechanics and functional mobility  Patient will demonstrate sit to stand x10 without UE to demonstrate improved LE strength.  Patient independent with prescribed HEP  Pt Education - Independent postural and  awareness and joint protection program  LONG TERM GOALS:  Patient will be independent with HEP to promote self management of condition  Patient will perform reciprocal stair negotiation to demonstrate improved LE strength.   Patient will ambulate with least restrictive device and proper gait  mechanics to promote return to prior level of function.    Functional Level - reported % (hobbies/work/recreation)

## 2025-01-06 ENCOUNTER — TREATMENT (OUTPATIENT)
Dept: PHYSICAL THERAPY | Facility: CLINIC | Age: 35
End: 2025-01-06
Payer: COMMERCIAL

## 2025-01-06 DIAGNOSIS — M12.872: ICD-10-CM

## 2025-01-06 PROCEDURE — 97110 THERAPEUTIC EXERCISES: CPT | Mod: GP,CQ

## 2025-01-06 ASSESSMENT — PAIN DESCRIPTION - DESCRIPTORS: DESCRIPTORS: SORE

## 2025-01-06 ASSESSMENT — PAIN SCALES - GENERAL: PAINLEVEL_OUTOF10: 1

## 2025-01-06 NOTE — PROGRESS NOTES
Physical Therapy Treatment    Patient Name: Raine Rothman  MRN: [id number]  Today's Date: 1/6/2025  Time Calculation  Start Time: 0847  Stop Time: 0932  Time Calculation (min): 45 min  PT Therapeutic Procedures Time Entry  Therapeutic Exercise Time Entry: 43    Insurance:  Visit number: 4 of 15  Authorization info: CIGNA - NO AUTH / 100% COVERAGE thru 12/31/24 / $750 DEDUCT MET / $3500 OOP MET / [employer] 1/1/25: 80% Coins / 60V pt/ot/st - 0 used / PER [employer] w/s / REF: [id number] / ds 12/19/24.   Insurance Type: Payor: [employer] / Plan: [employer] HEALTH PLAN / Product Type: *No Product type* /     Current Problem   1. Oth specific arthropathies, NEC, left ankle and foot  Follow Up In Physical Therapy          Subjective   General   Reason for Referral: Left ankle fusion  Referred By: Ayah Dominguez DPM  General Comment: Pt reports she was instructed by Dr Salazar to use ankle brace Vs walking boot, and WB status is now WBAT L LE.  Precautions:  Precautions  LE Weight Bearing Status: Weight Bearing as Tolerated  Pain   0-10 (Numeric) Pain Score: 1  Pain Type: Surgical pain  Pain Location: Ankle  Pain Orientation: Left  Pain Descriptors: Sore  Post Treatment Pain Level 2/10    Objective   Pt AMB FWB without L boot, slow to moderates reinaldo using RW.    Treatments:  Therapeutic Exercise:  Therapeutic Exercise  Therapeutic Exercise Performed: Yes  Therapeutic Exercise Activity 1: Nustep L5, 6'  Therapeutic Exercise Activity 2: Minisquats 2x10  Therapeutic Exercise Activity 3: Sidestep BTB above knees 20'x2 L/R each  Therapeutic Exercise Activity 4: Standing hip extension BTB above knees 2x10 L/R each  Therapeutic Exercise Activity 5: LAQs 3# 2x10  Therapeutic Exercise Activity 6: Standing hamstring curls 3# 2x10  Therapeutic Exercise Activity 7: Isometric hip ADD with abdominal brace 2x10  Therapeutic Exercise Activity 8: Isometric bridge with hip adduction 2x10  Therapeutic Exercise Activity 9: Isometric hip abduction with L4 BTB  "2x10  Therapeutic Exercise Activity 10: SL hip ABD R/L x10 each  Therapeutic Exercise Activity 11: Step ups on 4\" step 2x10  Therapeutic Exercise Activity 12: NBOS on foam x2'      Assessment   Assessment:   PT Assessment  PT Assessment Results:  (abnormal coordination, abnormal gait, abnormal muscle tone, abnormal or restricted ROM, activity intolerance, impaired balance, impaired physical strength, lacks appropriate HEP, pain with function, safety issue and weight-bearing intolerance)  Rehab Prognosis: Good  Evaluation/Treatment Tolerance: Patient tolerated treatment well  Assessment Comment: Pt tolerates ther ex progresswions and recent transition to FWB L LE with increases in L ankle soreness.    Plan:   OP PT Plan  Treatment/Interventions:  (balance/weight-bearing training, ADL retraining, body mechanics training, flexibility, functional ROM exercises, gait training, home exercise program, strengthening, stretching and therapeutic activities)  PT Plan: Skilled PT  PT Frequency: 2 times per week  Duration: 5 weeks  Number of Treatments Authorized: 10  Rehab Potential: Good  Plan of Care Agreement: Patient    OP EDUCATION:  Outpatient Education  Individual(s) Educated: Patient  Education Provided: Anatomy, Body Mechanics  Patient/Caregiver Demonstrated Understanding: yes  Patient Response to Education: Patient/Caregiver Verbalized Understanding of Information, Patient/Caregiver Performed Return Demonstration of Exercises/Activities, Patient/Caregiver Asked Appropriate Questions    Goals:     SHORT TERM GOALS:  Patient will report decrease in pain from 8/10 to </= 1/10 to improve quality of life.   Patient will improve Fused ankle Left AROM to WFL in order to improve gait mechanics and functional mobility  Patient will demonstrate sit to stand x10 without UE to demonstrate improved LE strength.  Patient independent with prescribed HEP  Pt Education - Independent postural and  awareness and joint " protection program  LONG TERM GOALS:  Patient will be independent with HEP to promote self management of condition  Patient will perform reciprocal stair negotiation to demonstrate improved LE strength.   Patient will ambulate with least restrictive device and proper gait mechanics to promote return to prior level of function.    Functional Level - reported % (hobbies/work/recreation)         no

## 2025-01-08 ENCOUNTER — TREATMENT (OUTPATIENT)
Dept: PHYSICAL THERAPY | Facility: CLINIC | Age: 35
End: 2025-01-08
Payer: COMMERCIAL

## 2025-01-08 DIAGNOSIS — M12.872: ICD-10-CM

## 2025-01-08 PROCEDURE — 97530 THERAPEUTIC ACTIVITIES: CPT | Mod: GP

## 2025-01-08 PROCEDURE — 97110 THERAPEUTIC EXERCISES: CPT | Mod: GP

## 2025-01-08 ASSESSMENT — PAIN SCALES - GENERAL: PAINLEVEL_OUTOF10: 3

## 2025-01-08 ASSESSMENT — PAIN DESCRIPTION - DESCRIPTORS: DESCRIPTORS: SORE

## 2025-01-08 NOTE — PROGRESS NOTES
"Physical Therapy Treatment    Patient Name: Raine Rothman  MRN: [id number]  Today's Date: 1/8/2025  Time Calculation  Start Time: 0845  Stop Time: 0933  Time Calculation (min): 48 min    Insurance:  Visit number: 5 of 15  Authorization info: CIGNA - NO AUTH / 100% COVERAGE thru 12/31/24 / $750 DEDUCT MET / $3500 OOP MET / [employer] 1/1/25: 80% Coins / 60V pt/ot/st - 0 used / PER [employer] w/s / REF: [id number] / ds 12/19/24.   Insurance Type: Payor: [employer] / Plan: [employer] HEALTH PLAN / Product Type: *No Product type* /     Current Problem   1. Oth specific arthropathies, NEC, left ankle and foot  Follow Up In Physical Therapy          Subjective   General   Released for \"[facility]\" - 1/3/25 (Dr. Dominguez), Cleared for aquatic activities  Reason for Referral: Left ankle fusion  Referred By: Ayah Dominguez DPM  General Comment: Pt reports she was instructed by Dr Salazar to use ankle brace Vs walking boot, and WB status is now WBAT L LE.  Precautions:  Precautions  LE Weight Bearing Status: Weight Bearing as Tolerated  Pain 3/10  0-10 (Numeric) Pain Score: 3  Pain Type: Surgical pain  Pain Location: Ankle  Pain Orientation: Left  Pain Descriptors: Sore  Post Treatment Pain Level 3/10    Objective       Treatments:  Therapeutic Exercise:  Therapeutic Exercise  Therapeutic Exercise Performed: Yes  Therapeutic Exercise Activity 1: Nustep L5, 6'  Therapeutic Exercise Activity 2: Minisquats 2x10  Therapeutic Exercise Activity 3: Sidestep BTB above knees 20'x2 L/R each  Therapeutic Exercise Activity 4: Standing hip extension BTB above knees 2x10 L/R each  Therapeutic Exercise Activity 5: LAQs 3# x 2min R/L  Therapeutic Exercise Activity 6: Standing hamstring curls 3# 2x10  Therapeutic Exercise Activity 7: Isometric hip ADD with abdominal brace 2x10  Therapeutic Exercise Activity 8: Isometric bridge with hip adduction 2x10  Therapeutic Exercise Activity 9: Isometric hip abduction with L4 BTB 2x10  Therapeutic Exercise Activity 10: SL hip ABD " "R/L x10 each  Therapeutic Exercise Activity 11: Step ups on 4\" step 2x10  Therapeutic Exercise Activity 12: NBOS on foam x 1min EO, 1min EC  Therapeutic Exercise Activity 13: sit-stand x 2min  Therapeutic Exercise Activity 14: standing marching 3# AW - 2x20 (walker support)  Therapeutic Exercise Activity 15: standing Hip ABD 2x15 3# AW  Therapeutic Exercise Activity 16: PB - FWD x 4  Therapeutic Exercise Activity 17: PB side stepping x 4  Therapeutic activity:     Gait: PB FWB (15min)     Assessment   Assessment:   PT Assessment  PT Assessment Results:  (abnormal coordination, abnormal gait, abnormal muscle tone, abnormal or restricted ROM, activity intolerance, impaired balance, impaired physical strength, lacks appropriate HEP, pain with function, safety issue and weight-bearing intolerance)  Rehab Prognosis: Good  Assessment Comment: Pt tolerates ther ex progresswions and recent transition to FWB L LE with increases in L ankle soreness.    Plan:   OP PT Plan  Treatment/Interventions:  (balance/weight-bearing training, ADL retraining, body mechanics training, flexibility, functional ROM exercises, gait training, home exercise program, strengthening, stretching and therapeutic activities)  PT Plan: Skilled PT  PT Frequency: 2 times per week  Duration: 5 weeks  Number of Treatments Authorized: 10  Rehab Potential: Good  Plan of Care Agreement: Patient  Continue to increase WB/gait, CKC - PRE/Balnce/proprio activities    OP EDUCATION:  Outpatient Education  Individual(s) Educated: Patient  Education Provided: Anatomy, Body Mechanics  Patient/Caregiver Demonstrated Understanding: yes  Patient Response to Education: Patient/Caregiver Verbalized Understanding of Information, Patient/Caregiver Performed Return Demonstration of Exercises/Activities, Patient/Caregiver Asked Appropriate Questions           "

## 2025-01-13 ENCOUNTER — TREATMENT (OUTPATIENT)
Dept: PHYSICAL THERAPY | Facility: CLINIC | Age: 35
End: 2025-01-13
Payer: COMMERCIAL

## 2025-01-13 DIAGNOSIS — M12.872: ICD-10-CM

## 2025-01-13 PROCEDURE — 97110 THERAPEUTIC EXERCISES: CPT | Mod: GP

## 2025-01-13 ASSESSMENT — PAIN SCALES - GENERAL: PAINLEVEL_OUTOF10: 2

## 2025-01-13 ASSESSMENT — PAIN DESCRIPTION - DESCRIPTORS: DESCRIPTORS: SORE

## 2025-01-13 NOTE — PROGRESS NOTES
Physical Therapy Treatment    Patient Name: Raine Rothman  MRN: [id number]  Today's Date: 1/13/2025  Time Calculation  Start Time: 0945  Stop Time: 1030  Time Calculation (min): 45 min    Insurance:  Visit number: 6 of 15  Authorization info: CIGNA - NO AUTH / 100% COVERAGE thru 12/31/24 / $750 DEDUCT MET / $3500 OOP MET / [employer] 1/1/25: 80% Coins / 60V pt/ot/st - 0 used / PER [employer] w/s / REF: [id number] / ds 12/19/24.   Insurance Type: Payor: [employer] / Plan: [employer] HEALTH PLAN / Product Type: *No Product type* /     Current Problem   1. Oth specific arthropathies, NEC, left ankle and foot  Follow Up In Physical Therapy          Subjective   General   No new complaints  Reports improved tolerance for WB and Independent Gait  Reduced pain/soreness Left ankle  MD appt 3 weeks - (2/5/25) Lisa Dominguez  Reason for Referral: Left ankle fusion  Referred By: Ayah Dominguez DPM  General Comment: Pt reports she was instructed by Dr Salazar to use ankle brace Vs walking boot, and WB status is now WBAT L LE.  Precautions: Left ankle fusion  Precautions  LE Weight Bearing Status: Weight Bearing as Tolerated  Pain 2/10  0-10 (Numeric) Pain Score: 2  Pain Type: Surgical pain  Pain Location: Ankle  Pain Orientation: Left  Pain Descriptors: Sore  Post Treatment Pain Level 2/10    Objective     Treatments:  Therapeutic Exercise:  Therapeutic Exercise  Therapeutic Exercise Performed: Yes  Therapeutic Exercise Activity 1: Nustep L5, 6'  Therapeutic Exercise Activity 2: Minisquats 2x10  Therapeutic Exercise Activity 3: Sidestep BTB above knees 20'x2 L/R each  Therapeutic Exercise Activity 4: Standing hip extension BTB above knees 2x10 L/R each  Therapeutic Exercise Activity 5: LAQs 3# x 2min R/L  Therapeutic Exercise Activity 6: PB - FWD high knee x 6  Therapeutic Exercise Activity 7: PB - Side stepping x6  Therapeutic Exercise Activity 8: Isometric bridge with hip adduction x 2min  Therapeutic Exercise Activity 9: Isometric hip abduction with  "L4 BTB x 2min  Therapeutic Exercise Activity 10: SL hip ABD R/L x10 each  Therapeutic Exercise Activity 11: Step ups on 4\" step 2x10  Therapeutic Exercise Activity 12: NBOS on foam x 1min EO, 1min EC  Therapeutic Exercise Activity 13: sit-stand x 2min  Therapeutic Exercise Activity 14: standing marching 3#/3# 2x20  Therapeutic Exercise Activity 15: standing Hip ABD 2x15 3# AW  Therapeutic Exercise Activity 16: PB - FWD x 4  Therapeutic Exercise Activity 17: PB side stepping x 4  Therapeutic Exercise Activity 18: Seated R/L SAQ x 2min  Assessment   Assessment:   PT Assessment  PT Assessment Results:  (abnormal coordination, abnormal gait, abnormal muscle tone, abnormal or restricted ROM, activity intolerance, impaired balance, impaired physical strength, lacks appropriate HEP, pain with function, safety issue and weight-bearing intolerance)  Rehab Prognosis: Good  Assessment Comment: Pt tolerates ther ex progresswions and recent transition to FWB L LE with increases in L ankle soreness.  Significant improvement - WB/Gait, transfers, CKC - balance/PRE's  Plan:   OP PT Plan  Treatment/Interventions:  (balance/weight-bearing training, ADL retraining, body mechanics training, flexibility, functional ROM exercises, gait training, home exercise program, strengthening, stretching and therapeutic activities)  PT Plan: Skilled PT  PT Frequency: 2 times per week  Duration: 5 weeks  Number of Treatments Authorized: 10  Rehab Potential: Good  Plan of Care Agreement: Patient    OP EDUCATION:  Outpatient Education  Individual(s) Educated: Patient  Education Provided: Anatomy, Body Mechanics  Patient/Caregiver Demonstrated Understanding: yes  Patient Response to Education: Patient/Caregiver Verbalized Understanding of Information, Patient/Caregiver Performed Return Demonstration of Exercises/Activities, Patient/Caregiver Asked Appropriate Questions    Goals: Cont PT POC       "

## 2025-01-15 ENCOUNTER — TREATMENT (OUTPATIENT)
Dept: PHYSICAL THERAPY | Facility: CLINIC | Age: 35
End: 2025-01-15
Payer: COMMERCIAL

## 2025-01-15 DIAGNOSIS — M12.872: ICD-10-CM

## 2025-01-15 PROCEDURE — 97112 NEUROMUSCULAR REEDUCATION: CPT | Mod: GP,CQ

## 2025-01-15 PROCEDURE — 97110 THERAPEUTIC EXERCISES: CPT | Mod: GP,CQ

## 2025-01-15 ASSESSMENT — PAIN DESCRIPTION - DESCRIPTORS: DESCRIPTORS: TIGHTNESS;SORE

## 2025-01-15 ASSESSMENT — PAIN SCALES - GENERAL: PAINLEVEL_OUTOF10: 1

## 2025-01-15 NOTE — PROGRESS NOTES
"Physical Therapy Treatment    Patient Name: Raine Rothman  MRN: [id number]  Today's Date: 1/15/2025  Time Calculation  Start Time: 0800  Stop Time: 0845  Time Calculation (min): 45 min  PT Therapeutic Procedures Time Entry  Neuromuscular Re-Education Time Entry: 10  Therapeutic Exercise Time Entry: 31    Insurance:  Visit number: 7 of 15  Authorization info: CIGNA - NO AUTH / 100% COVERAGE thru 12/31/24 / $750 DEDUCT MET / $3500 OOP MET / [employer] 1/1/25: 80% Coins / 60V pt/ot/st - 0 used / PER [employer] w/s / REF: [id number] / ds 12/19/24.     Insurance Type: Payor: [employer] / Plan: [employer] HEALTH PLAN / Product Type: *No Product type* /     Current Problem   1. Oth specific arthropathies, NEC, left ankle and foot  Follow Up In Physical Therapy          Subjective   General   Reason for Referral: Left ankle fusion  Referred By: Ayah Dominguez DPM  General Comment: Pt reports L ankle stiffness this morning  Precautions:  Precautions  LE Weight Bearing Status: Weight Bearing as Tolerated  Pain   0-10 (Numeric) Pain Score: 1  Pain Type: Surgical pain  Pain Location: Ankle  Pain Orientation: Left  Pain Descriptors: Tightness, Sore  Post Treatment Pain Level 1/10    Objective   Pt did not require UE support during neuro activities on foam pad this visit.    Treatments:  Therapeutic Exercise:  Therapeutic Exercise  Therapeutic Exercise Performed: Yes  Therapeutic Exercise Activity 1: Nustep L5, 6'  Therapeutic Exercise Activity 2: Minisquats 2x10  Therapeutic Exercise Activity 3: Sidestep Dark BTB above knees 20'x2 L/R each  Therapeutic Exercise Activity 4: Zig-Zag wth Dark Blue band above knees 20'x2 F/B each  Therapeutic Exercise Activity 5: Standing MIP 2x10 L/R each  Therapeutic Exercise Activity 6: Step ups 6\" step 2x10  Therapeutic Exercise Activity 7: LAQs 4# 2x15  Therapeutic Exercise Activity 8: Standing hamstring curls 4# 2x15  Therapeutic Exercise Activity 9: Isometric bridge with hip adduction 2x10  Therapeutic " activity:     Neuro Re-ed:   Balance/Neuromuscular Re-Education  Balance/Neuromuscular Re-Education Activity Performed: Yes  Balance/Neuromuscular Re-Education Activity 1: NBOS on foam EO 2'  Balance/Neuromuscular Re-Education Activity 2: NBOS on foam EC 2'  Balance/Neuromuscular Re-Education Activity 3: MIP on foam pad 2'  Balance/Neuromuscular Re-Education Activity 4: Semitandemstand leadL/R 2' each       Assessment   Assessment:   PT Assessment  PT Assessment Results:  (abnormal coordination, abnormal gait, abnormal muscle tone, abnormal or restricted ROM, activity intolerance, impaired balance, impaired physical strength, lacks appropriate HEP, pain with function, safety issue and weight-bearing intolerance)  Rehab Prognosis: Good  Assessment Comment: Pt tolerates ther ex activites with transinet increases in S/S, improved stability during standing activities on compliant surface.    Plan:   OP PT Plan  Treatment/Interventions:  (balance/weight-bearing training, ADL retraining, body mechanics training, flexibility, functional ROM exercises, gait training, home exercise program, strengthening, stretching and therapeutic activities)  PT Plan: Skilled PT  PT Frequency: 2 times per week  Duration: 5 weeks  Number of Treatments Authorized: 10  Rehab Potential: Good  Plan of Care Agreement: Patient    OP EDUCATION:  Outpatient Education  Individual(s) Educated: Patient  Education Provided: Body Mechanics, Anatomy  Patient/Caregiver Demonstrated Understanding: yes  Patient Response to Education: Patient/Caregiver Verbalized Understanding of Information, Patient/Caregiver Performed Return Demonstration of Exercises/Activities, Patient/Caregiver Asked Appropriate Questions    Goals:     SHORT TERM GOALS:  Patient will report decrease in pain from 8/10 to </= 1/10 to improve quality of life.   Patient will improve Fused ankle Left AROM to WFL in order to improve gait mechanics and functional mobility  Patient will  demonstrate sit to stand x10 without UE to demonstrate improved LE strength.  Patient independent with prescribed HEP  Pt Education - Independent postural and  awareness and joint protection program  LONG TERM GOALS:  Patient will be independent with HEP to promote self management of condition  Patient will perform reciprocal stair negotiation to demonstrate improved LE strength.   Patient will ambulate with least restrictive device and proper gait mechanics to promote return to prior level of function.    Functional Level - reported % (hobbies/work/recreation)

## 2025-01-20 ENCOUNTER — TREATMENT (OUTPATIENT)
Dept: PHYSICAL THERAPY | Facility: CLINIC | Age: 35
End: 2025-01-20
Payer: COMMERCIAL

## 2025-01-20 DIAGNOSIS — M12.872: ICD-10-CM

## 2025-01-20 PROCEDURE — 97112 NEUROMUSCULAR REEDUCATION: CPT | Mod: GP,CQ

## 2025-01-20 PROCEDURE — 97110 THERAPEUTIC EXERCISES: CPT | Mod: GP,CQ

## 2025-01-20 ASSESSMENT — PAIN SCALES - GENERAL: PAINLEVEL_OUTOF10: 0 - NO PAIN

## 2025-01-20 NOTE — PROGRESS NOTES
"Physical Therapy Treatment    Patient Name: Raine Rothman  MRN: [id number]  Today's Date: 1/20/2025  Time Calculation  Start Time: 0800  Stop Time: 0845  Time Calculation (min): 45 min  PT Therapeutic Procedures Time Entry  Neuromuscular Re-Education Time Entry: 12  Therapeutic Exercise Time Entry: 31    Insurance:  Visit number: 8 of 15  Authorization info: 2025 - [employer] - NO AUTH / $750 DEDUCT not met / 80% coins / $3500 OOP not met / 60V pt/ot/st - 0 used / PER [employer] w/s / REF: [id number] / ds 1/19/25.   CPT 10429, 47145 not covered.      [employer] - NO AUTH / 100% COVERAGE thru 12/31/24 / $750 DEDUCT MET / $3500 OOP MET / [employer] 1/1/25: 80% Coins / 60V pt/ot/st - 0 used / PER [employer] w/s / REF: [id number] / ds 12/19/24.   Insurance Type: Payor: LOVE / Plan: [employer] HEALTH PLAN / Product Type: *No Product type* /     Current Problem   1. Oth specific arthropathies, NEC, left ankle and foot  Follow Up In Physical Therapy          Subjective   General   Reason for Referral: Left ankle fusion  Referred By: Ayah Dominguez DPM  General Comment: Pt reports no L anklepain S/S this morning.  Precautions:  Precautions  LE Weight Bearing Status: Weight Bearing as Tolerated  Pain   0-10 (Numeric) Pain Score: 0 - No pain  Post Treatment Pain Level 0/10    Objective   Pt requires intermittent UE assist during dynamic neuro activities.    Treatments:  Therapeutic Exercise:  Therapeutic Exercise  Therapeutic Exercise Performed: Yes  Therapeutic Exercise Activity 1: Nustep L5, 6'  Therapeutic Exercise Activity 2: Minisquats 2x10  Therapeutic Exercise Activity 3: Sidestep Dark BTB above knees 20'x2 L/R each  Therapeutic Exercise Activity 4: Zig-Zag wth Dark Blue band above knees 20'x2 F/B each  Therapeutic Exercise Activity 5: Standing MIP 2x10 L/R each  Therapeutic Exercise Activity 6: Step ups 6\" step 2x10  Therapeutic Exercise Activity 7: LAQs 5# 2x15  Therapeutic Exercise Activity 8: Standing hamstring curls 5# 2x15  Therapeutic " Exercise Activity 9: Isometric bridge with hip adduction 2x10    Neuro Re-ed:   Balance/Neuromuscular Re-Education  Balance/Neuromuscular Re-Education Activity Performed: Yes  Balance/Neuromuscular Re-Education Activity 1: MIP on foam pad 2'  Balance/Neuromuscular Re-Education Activity 2: Semitandemstand leadL/R 2' each  Balance/Neuromuscular Re-Education Activity 3: Sidestep on foam beam 5 laps  Balance/Neuromuscular Re-Education Activity 4: Lateral low tamy stepover 1x15 L/R each  Balance/Neuromuscular Re-Education Activity 5: FWD low tamy stepover 1x15 L/R each      Assessment   Assessment:   PT Assessment  PT Assessment Results:  (abnormal coordination, abnormal gait, abnormal muscle tone, abnormal or restricted ROM, activity intolerance, impaired balance, impaired physical strength, lacks appropriate HEP, pain with function, safety issue and weight-bearing intolerance)  Rehab Prognosis: Good  Assessment Comment: Pt tolerates treatment without exacerbation of L ankle pain S/S. mild to mod instability observed during dynamic neuro progressions    Plan:   OP PT Plan  Treatment/Interventions:  (balance/weight-bearing training, ADL retraining, body mechanics training, flexibility, functional ROM exercises, gait training, home exercise program, strengthening, stretching and therapeutic activities)  PT Plan: Skilled PT  PT Frequency: 2 times per week  Duration: 5 weeks  Number of Treatments Authorized: 10  Rehab Potential: Good  Plan of Care Agreement: Patient    OP EDUCATION:  Outpatient Education  Individual(s) Educated: Patient  Education Provided: Anatomy, Body Mechanics  Patient/Caregiver Demonstrated Understanding: yes  Patient Response to Education: Patient/Caregiver Verbalized Understanding of Information, Patient/Caregiver Performed Return Demonstration of Exercises/Activities, Patient/Caregiver Asked Appropriate Questions    Goals:     SHORT TERM GOALS:  Patient will report decrease in pain from 8/10 to  </= 1/10 to improve quality of life.   Patient will improve Fused ankle Left AROM to WFL in order to improve gait mechanics and functional mobility  Patient will demonstrate sit to stand x10 without UE to demonstrate improved LE strength.  Patient independent with prescribed HEP  Pt Education - Independent postural and  awareness and joint protection program  LONG TERM GOALS:  Patient will be independent with HEP to promote self management of condition  Patient will perform reciprocal stair negotiation to demonstrate improved LE strength.   Patient will ambulate with least restrictive device and proper gait mechanics to promote return to prior level of function.    Functional Level - reported % (hobbies/work/recreation)

## 2025-01-22 ENCOUNTER — TREATMENT (OUTPATIENT)
Dept: PHYSICAL THERAPY | Facility: CLINIC | Age: 35
End: 2025-01-22
Payer: COMMERCIAL

## 2025-01-22 DIAGNOSIS — M12.872: ICD-10-CM

## 2025-01-22 PROCEDURE — 97110 THERAPEUTIC EXERCISES: CPT | Mod: GP,CQ

## 2025-01-22 PROCEDURE — 97112 NEUROMUSCULAR REEDUCATION: CPT | Mod: GP,CQ

## 2025-01-22 ASSESSMENT — PAIN DESCRIPTION - DESCRIPTORS: DESCRIPTORS: DISCOMFORT

## 2025-01-22 ASSESSMENT — PAIN SCALES - GENERAL: PAINLEVEL_OUTOF10: 2

## 2025-01-22 NOTE — PROGRESS NOTES
"Physical Therapy Treatment    Patient Name: Raine Rothman  MRN: [id number]  Today's Date: 1/22/2025  Time Calculation  Start Time: 0800  Stop Time: 0845  Time Calculation (min): 45 min  PT Therapeutic Procedures Time Entry  Neuromuscular Re-Education Time Entry: 11  Therapeutic Exercise Time Entry: 32    Insurance:  Visit number: 9 of 15  Authorization info: pt/ot/st - 0 used / PER [employer] w/s / REF: [id number] / ds 1/19/25.   CPT 31008, 07781 not covered.      [employer] - NO AUTH / 100% COVERAGE thru 12/31/24 / $750 DEDUCT MET / $3500 OOP MET / [employer] 1/1/25: 80% Coins / 60V pt/ot/st - 0 used / PER C[employer] w/s / REF: [id number] / ds 12/19/24.   Insurance Type: Payor: LOVE / Plan: [employer] HE[employer] PLAN / Product Type: *No Product type* /     Current Problem   1. Oth specific arthropathies, NEC, left ankle and foot  Follow Up In Physical Therapy          Subjective   General   Reason for Referral: Left ankle fusion  Referred By: Ayah Dominguez DPM  General Comment: Pt reports mild L ankle pain on arrival.  Precautions:  Precautions  LE Weight Bearing Status: Weight Bearing as Tolerated  Pain   0-10 (Numeric) Pain Score: 2  Pain Type: Surgical pain  Pain Location: Ankle  Pain Orientation: Left  Pain Descriptors: Discomfort  Post Treatment Pain Level 2/10    Objective   Pt continues to require intermittent UE support during neuro activities/progressions.    Treatments:  Therapeutic Exercise:  Therapeutic Exercise  Therapeutic Exercise Activity 1: Nustep L5, 6'  Therapeutic Exercise Activity 2: Minisquats 2x10  Therapeutic Exercise Activity 3: Sidestep Dark BTB above knees 20'x2 L/R each  Therapeutic Exercise Activity 4: Zig-Zag wth Dark Blue band above knees 20'x2 F/B each  Therapeutic Exercise Activity 5: Standing MIP 2x10 L/R each  Therapeutic Exercise Activity 6: Step ups 6\" step 2x10  Therapeutic Exercise Activity 7: LAQs 5# 2x15  Therapeutic Exercise Activity 8: Standing hamstring curls 5# 2x15  Therapeutic Exercise Activity " 9: Isometric bridge with hip adduction 2x10    Neuro Re-ed:   Balance/Neuromuscular Re-Education  Balance/Neuromuscular Re-Education Activity Performed: Yes  Balance/Neuromuscular Re-Education Activity 1: Lunges on BOSU 2x10 L/R each  Balance/Neuromuscular Re-Education Activity 2: MIP on foam pad 2'  Balance/Neuromuscular Re-Education Activity 4: Lateral low tamy stepover 1x15 L/R each  Balance/Neuromuscular Re-Education Activity 5: FWD low tamy stepover 1x15 L/R each     Assessment   Assessment:   PT Assessment  PT Assessment Results:  (abnormal coordination, abnormal gait, abnormal muscle tone, abnormal or restricted ROM, activity intolerance, impaired balance, impaired physical strength, lacks appropriate HEP, pain with function, safety issue and weight-bearing intolerance)  Rehab Prognosis: Good  Assessment Comment: Mild instability observed during balance activities.    Plan:   OP PT Plan  Treatment/Interventions:  (balance/weight-bearing training, ADL retraining, body mechanics training, flexibility, functional ROM exercises, gait training, home exercise program, strengthening, stretching and therapeutic activities)  PT Plan: Skilled PT  PT Frequency: 2 times per week  Duration: 5 weeks  Number of Treatments Authorized: 10  Rehab Potential: Good  Plan of Care Agreement: Patient    OP EDUCATION:  Outpatient Education  Individual(s) Educated: Patient  Education Provided: Body Mechanics, Anatomy  Patient/Caregiver Demonstrated Understanding: yes  Patient Response to Education: Patient/Caregiver Verbalized Understanding of Information, Patient/Caregiver Performed Return Demonstration of Exercises/Activities, Patient/Caregiver Asked Appropriate Questions    Goals:    SHORT TERM GOALS:  Patient will report decrease in pain from 8/10 to </= 1/10 to improve quality of life.   Patient will improve Fused ankle Left AROM to WFL in order to improve gait mechanics and functional mobility  Patient will demonstrate sit  to stand x10 without UE to demonstrate improved LE strength.  Patient independent with prescribed HEP  Pt Education - Independent postural and  awareness and joint protection program  LONG TERM GOALS:  Patient will be independent with HEP to promote self management of condition  Patient will perform reciprocal stair negotiation to demonstrate improved LE strength.   Patient will ambulate with least restrictive device and proper gait mechanics to promote return to prior level of function.    Functional Level - reported % (hobbies/work/recreation)

## 2025-01-24 ENCOUNTER — TELEPHONE (OUTPATIENT)
Dept: PHYSICAL THERAPY | Facility: CLINIC | Age: 35
End: 2025-01-24
Payer: COMMERCIAL

## 2025-01-24 NOTE — TELEPHONE ENCOUNTER
Called patient and left a message for her to call back we need to reschedule her appointment on 02/05/25 with Syed

## 2025-01-27 ENCOUNTER — TREATMENT (OUTPATIENT)
Dept: PHYSICAL THERAPY | Facility: CLINIC | Age: 35
End: 2025-01-27
Payer: COMMERCIAL

## 2025-01-27 DIAGNOSIS — M12.872: ICD-10-CM

## 2025-01-27 PROCEDURE — 97110 THERAPEUTIC EXERCISES: CPT | Mod: GP,CQ

## 2025-01-27 PROCEDURE — 97112 NEUROMUSCULAR REEDUCATION: CPT | Mod: GP,CQ

## 2025-01-27 ASSESSMENT — PAIN SCALES - GENERAL: PAINLEVEL_OUTOF10: 0 - NO PAIN

## 2025-01-27 NOTE — PROGRESS NOTES
"Physical Therapy Treatment    Patient Name: Raine Rothman  MRN: [id number]  Today's Date: 1/27/2025  Time Calculation  Start Time: 0800  Stop Time: 0845  Time Calculation (min): 45 min  PT Therapeutic Procedures Time Entry  Neuromuscular Re-Education Time Entry: 12  Therapeutic Exercise Time Entry: 32    Insurance:  Visit number: 10 of 15  Authorization info: pt/ot/st - 0 used / PER [employer] w/s / REF: [id number] / ds 1/19/25.   CPT 42639, 91147 not covered.  Insurance Type: Payor: [employer] / Plan: [employer] HEALTH PLAN / Product Type: *No Product type* /     Current Problem   1. Oth specific arthropathies, NEC, left ankle and foot  Follow Up In Physical Therapy          Subjective   General   Reason for Referral: Left ankle fusion  Referred By: Ayah Dominguez DPM  General Comment: Pt reports bno pain on arrival.  Precautions:  Precautions  LE Weight Bearing Status: Weight Bearing as Tolerated  Pain   0-10 (Numeric) Pain Score: 0 - No pain  Post Treatment Pain Level 0/10    Objective   Adjusted L ankle brace to reduced L ankle inversion during standing activities.    Treatments:  Therapeutic Exercise:  Therapeutic Exercise  Therapeutic Exercise Activity 1: Nustep L5, 6'  Therapeutic Exercise Activity 2: Minisquats 2x10  Therapeutic Exercise Activity 3: Sidestep Dark BTB above knees 20'x2 L/R each  Therapeutic Exercise Activity 4: Zig-Zag wth Dark Blue band above knees 20'x2 F/B each  Therapeutic Exercise Activity 6: Step ups 6\" step 2x10  Therapeutic Exercise Activity 7: Sit to stand 2x10  Therapeutic Exercise Activity 8: Standing hamstring curls 5# 2x15  Therapeutic Exercise Activity 9: Isometric bridge with hip adduction 2x10  Therapeutic Exercise Activity 10: Isometric bridge with hip adduction 2x10  Therapeutic activity:     Neuro Re-ed:   Balance/Neuromuscular Re-Education  Balance/Neuromuscular Re-Education Activity Performed: Yes  Balance/Neuromuscular Re-Education Activity 1: Lunges on BOSU 2x10 L/R " each  Balance/Neuromuscular Re-Education Activity 2: Lateral lunges to BOSU L 2x10  Balance/Neuromuscular Re-Education Activity 3: MIP on foam pad 2'  Balance/Neuromuscular Re-Education Activity 4: Lateral low tamy stepover 1x15 L/R each  Balance/Neuromuscular Re-Education Activity 5: FWD low tamy stepover 1x15 L/R each     Assessment   Assessment:   PT Assessment  PT Assessment Results:  (abnormal coordination, abnormal gait, abnormal muscle tone, abnormal or restricted ROM, activity intolerance, impaired balance, impaired physical strength, lacks appropriate HEP, pain with function, safety issue and weight-bearing intolerance)  Rehab Prognosis: Good  Assessment Comment: Improved L ankle to neutral after brace adjustment, tolerates ther ex activities [id number] exacerbation of S/S.    Plan:   OP PT Plan  Treatment/Interventions:  (balance/weight-bearing training, ADL retraining, body mechanics training, flexibility, functional ROM exercises, gait training, home exercise program, strengthening, stretching and therapeutic activities)  PT Plan: Skilled PT  PT Frequency: 2 times per week  Duration: 5 weeks  Number of Treatments Authorized: 10  Rehab Potential: Good  Plan of Care Agreement: Patient    OP EDUCATION:  Outpatient Education  Individual(s) Educated: Patient  Education Provided: Body Mechanics, Fall Risk  Patient/Caregiver Demonstrated Understanding: yes  Patient Response to Education: Patient/Caregiver Verbalized Understanding of Information, Patient/Caregiver Performed Return Demonstration of Exercises/Activities, Patient/Caregiver Asked Appropriate Questions    HEP/Access Codes:    Goals:    SHORT TERM GOALS:  Patient will report decrease in pain from 8/10 to </= 1/10 to improve quality of life.   Patient will improve Fused ankle Left AROM to WFL in order to improve gait mechanics and functional mobility  Patient will demonstrate sit to stand x10 without UE to demonstrate improved LE strength.  Patient  independent with prescribed HEP  Pt Education - Independent postural and  awareness and joint protection program  LONG TERM GOALS:  Patient will be independent with HEP to promote self management of condition  Patient will perform reciprocal stair negotiation to demonstrate improved LE strength.   Patient will ambulate with least restrictive device and proper gait mechanics to promote return to prior level of function.    Functional Level - reported % (hobbies/work/recreation)

## 2025-01-29 ENCOUNTER — TREATMENT (OUTPATIENT)
Dept: PHYSICAL THERAPY | Facility: CLINIC | Age: 35
End: 2025-01-29
Payer: COMMERCIAL

## 2025-01-29 DIAGNOSIS — M12.872: ICD-10-CM

## 2025-01-29 PROCEDURE — 97112 NEUROMUSCULAR REEDUCATION: CPT | Mod: GP

## 2025-01-29 PROCEDURE — 97110 THERAPEUTIC EXERCISES: CPT | Mod: GP

## 2025-01-29 NOTE — PROGRESS NOTES
"Physical Therapy Treatment    Patient Name: Raine Rothman  MRN: [id number]  Today's Date: 1/29/2025       Insurance:  Visit number: 11 of 15  Authorization info: 2025 - [employer] - NO AUTH / $750 DEDUCT not met / 80% coins / $3500 OOP not met / 60V pt/ot/st - 0 used / PER [employer] w/s / REF: [id number] / ds 1/19/25.   CPT 93211, 97585 not covered.       [employer] - NO AUTH / 100% COVERAGE thru 12/31/24 / $750 DEDUCT MET / $3500 OOP MET / [employer] 1/1/25: 80% Coins / 60V pt/ot/st - 0 used / PER [employer] w/s / REF: [id number] / ds 12/19/24.       Insurance Type: Payor: LOVE / Plan: [employer] HEALTH PLAN / Product Type: *No Product type* /     Current Problem   1. Oth specific arthropathies, NEC, left ankle and foot  Follow Up In Physical Therapy          Subjective   General    Pt reported that she has been going without her ankle brace per her physician's instruction.   Precautions:   LE Weight Bearing Status: Weight Bearing as Tolerated   \"Absolutely NO range of motion to Left ankle joint- Per MD\"  PT to address gait training and progression of WB  (Non-WB..partial WB..full WB - per MD).     PT - initiate ROM/PRE's hips/knee,Right ankle - Non-WB  Progress gait/WB per MD orders  Pain    0/10  Post Treatment Pain Level 0/10    Objective       Treatments:  Therapeutic Exercise:     Therapeutic Exercise Activity 1: Nustep L5, 6'  Therapeutic Exercise Activity 2: Minisquats 2x10  Therapeutic Exercise Activity 3: Sidestep Dark BTB above knees 20'x2 L/R each  Therapeutic Exercise Activity 4: Zig-Zag wth Dark Blue band above knees 20'x2 F/B each  Therapeutic Exercise Activity 5: Standing MIP 2x10 L/R each  Therapeutic Exercise Activity 6: Step ups 6\" step 2x10  Therapeutic Exercise Activity 7: LAQs 5# 2x10  Therapeutic Exercise Activity 8: Standing hamstring curls 5# 2x15  Therapeutic Exercise Activity 9: Isometric bridge with hip adduction 2x10     Neuro Re-ed:   Balance/Neuromuscular Re-Education Activity Performed: Yes  Balance/Neuromuscular " Re-Education Activity 1: Lunges forward/lateral on BOSU 2x10 L/R each  Balance/Neuromuscular Re-Education Activity 2: MIP on foam pad 1.5mins  Balance/Neuromuscular Re-Education Activity 4: Lateral low tamy stepover 1x15 L/R each  Balance/Neuromuscular Re-Education Activity 5: FWD low tamy stepover 1x21 L/R each              Assessment   Assessment:    Pt able to perform above activities without increased pain     Plan:    Continue to progress as tolerated     OP EDUCATION:   Pt educated on technique, appropriate progression    HEP/Access Codes:    Goals:     SHORT TERM GOALS:  Patient will report decrease in pain from 8/10 to </= 1/10 to improve quality of life.   Patient will improve Fused ankle Left AROM to WFL in order to improve gait mechanics and functional mobility  Patient will demonstrate sit to stand x10 without UE to demonstrate improved LE strength.  Patient independent with prescribed HEP  Pt Education - Independent postural and  awareness and joint protection program  LONG TERM GOALS:  Patient will be independent with HEP to promote self management of condition  Patient will perform reciprocal stair negotiation to demonstrate improved LE strength.   Patient will ambulate with least restrictive device and proper gait mechanics to promote return to prior level of function.    Functional Level - reported % (hobbies/work/recreation)

## 2025-02-03 ENCOUNTER — APPOINTMENT (OUTPATIENT)
Dept: PHYSICAL THERAPY | Facility: CLINIC | Age: 35
End: 2025-02-03
Payer: COMMERCIAL

## 2025-02-05 ENCOUNTER — APPOINTMENT (OUTPATIENT)
Dept: PHYSICAL THERAPY | Facility: CLINIC | Age: 35
End: 2025-02-05
Payer: COMMERCIAL

## 2025-07-20 ENCOUNTER — APPOINTMENT (OUTPATIENT)
Dept: RADIOLOGY | Facility: HOSPITAL | Age: 35
End: 2025-07-20
Payer: COMMERCIAL

## 2025-07-20 ENCOUNTER — HOSPITAL ENCOUNTER (EMERGENCY)
Facility: HOSPITAL | Age: 35
Discharge: HOME | End: 2025-07-20
Payer: COMMERCIAL

## 2025-07-20 ENCOUNTER — APPOINTMENT (OUTPATIENT)
Dept: CARDIOLOGY | Facility: HOSPITAL | Age: 35
End: 2025-07-20
Payer: COMMERCIAL

## 2025-07-20 VITALS
TEMPERATURE: 97.9 F | HEART RATE: 74 BPM | BODY MASS INDEX: 41.95 KG/M2 | OXYGEN SATURATION: 100 % | RESPIRATION RATE: 16 BRPM | HEIGHT: 70 IN | DIASTOLIC BLOOD PRESSURE: 85 MMHG | SYSTOLIC BLOOD PRESSURE: 129 MMHG | WEIGHT: 293 LBS

## 2025-07-20 DIAGNOSIS — R07.9 CHEST PAIN, UNSPECIFIED TYPE: Primary | ICD-10-CM

## 2025-07-20 LAB
ALBUMIN SERPL BCP-MCNC: 4.4 G/DL (ref 3.4–5)
ALP SERPL-CCNC: 89 U/L (ref 33–110)
ALT SERPL W P-5'-P-CCNC: 15 U/L (ref 7–45)
ANION GAP SERPL CALCULATED.3IONS-SCNC: 11 MMOL/L (ref 10–20)
APPEARANCE UR: CLEAR
AST SERPL W P-5'-P-CCNC: 20 U/L (ref 9–39)
BASOPHILS # BLD AUTO: 0.03 X10*3/UL (ref 0–0.1)
BASOPHILS NFR BLD AUTO: 0.4 %
BILIRUB SERPL-MCNC: 0.8 MG/DL (ref 0–1.2)
BILIRUB UR STRIP.AUTO-MCNC: NEGATIVE MG/DL
BUN SERPL-MCNC: 12 MG/DL (ref 6–23)
CALCIUM SERPL-MCNC: 9.3 MG/DL (ref 8.6–10.3)
CARDIAC TROPONIN I PNL SERPL HS: 4 NG/L (ref 0–13)
CARDIAC TROPONIN I PNL SERPL HS: 5 NG/L (ref 0–13)
CHLORIDE SERPL-SCNC: 106 MMOL/L (ref 98–107)
CO2 SERPL-SCNC: 24 MMOL/L (ref 21–32)
COLOR UR: NORMAL
CREAT SERPL-MCNC: 0.69 MG/DL (ref 0.5–1.05)
D DIMER PPP FEU-MCNC: 0.59 MG/L FEU (ref 0.19–0.5)
EGFRCR SERPLBLD CKD-EPI 2021: >90 ML/MIN/1.73M*2
EOSINOPHIL # BLD AUTO: 0.03 X10*3/UL (ref 0–0.7)
EOSINOPHIL NFR BLD AUTO: 0.4 %
ERYTHROCYTE [DISTWIDTH] IN BLOOD BY AUTOMATED COUNT: 12.6 % (ref 11.5–14.5)
GLUCOSE SERPL-MCNC: 85 MG/DL (ref 74–99)
GLUCOSE UR STRIP.AUTO-MCNC: NORMAL MG/DL
HCG UR QL IA.RAPID: NEGATIVE
HCT VFR BLD AUTO: 39.7 % (ref 36–46)
HGB BLD-MCNC: 13.8 G/DL (ref 12–16)
IMM GRANULOCYTES # BLD AUTO: 0.03 X10*3/UL (ref 0–0.7)
IMM GRANULOCYTES NFR BLD AUTO: 0.4 % (ref 0–0.9)
KETONES UR STRIP.AUTO-MCNC: NEGATIVE MG/DL
LEUKOCYTE ESTERASE UR QL STRIP.AUTO: NEGATIVE
LIPASE SERPL-CCNC: 54 U/L (ref 9–82)
LYMPHOCYTES # BLD AUTO: 2.31 X10*3/UL (ref 1.2–4.8)
LYMPHOCYTES NFR BLD AUTO: 32.8 %
MCH RBC QN AUTO: 30.2 PG (ref 26–34)
MCHC RBC AUTO-ENTMCNC: 34.8 G/DL (ref 32–36)
MCV RBC AUTO: 87 FL (ref 80–100)
MONOCYTES # BLD AUTO: 0.27 X10*3/UL (ref 0.1–1)
MONOCYTES NFR BLD AUTO: 3.8 %
NEUTROPHILS # BLD AUTO: 4.38 X10*3/UL (ref 1.2–7.7)
NEUTROPHILS NFR BLD AUTO: 62.2 %
NITRITE UR QL STRIP.AUTO: NEGATIVE
NRBC BLD-RTO: 0 /100 WBCS (ref 0–0)
PH UR STRIP.AUTO: 6.5 [PH]
PLATELET # BLD AUTO: 198 X10*3/UL (ref 150–450)
POTASSIUM SERPL-SCNC: 3.6 MMOL/L (ref 3.5–5.3)
PROT SERPL-MCNC: 7.6 G/DL (ref 6.4–8.2)
PROT UR STRIP.AUTO-MCNC: NEGATIVE MG/DL
RBC # BLD AUTO: 4.57 X10*6/UL (ref 4–5.2)
RBC # UR STRIP.AUTO: NEGATIVE MG/DL
SODIUM SERPL-SCNC: 137 MMOL/L (ref 136–145)
SP GR UR STRIP.AUTO: 1.02
UROBILINOGEN UR STRIP.AUTO-MCNC: NORMAL MG/DL
WBC # BLD AUTO: 7.1 X10*3/UL (ref 4.4–11.3)

## 2025-07-20 PROCEDURE — 36415 COLL VENOUS BLD VENIPUNCTURE: CPT | Performed by: PHYSICIAN ASSISTANT

## 2025-07-20 PROCEDURE — 96375 TX/PRO/DX INJ NEW DRUG ADDON: CPT | Mod: 59

## 2025-07-20 PROCEDURE — 85025 COMPLETE CBC W/AUTO DIFF WBC: CPT | Performed by: PHYSICIAN ASSISTANT

## 2025-07-20 PROCEDURE — 93005 ELECTROCARDIOGRAM TRACING: CPT

## 2025-07-20 PROCEDURE — 84484 ASSAY OF TROPONIN QUANT: CPT | Performed by: PHYSICIAN ASSISTANT

## 2025-07-20 PROCEDURE — 71275 CT ANGIOGRAPHY CHEST: CPT

## 2025-07-20 PROCEDURE — 96374 THER/PROPH/DIAG INJ IV PUSH: CPT | Mod: 59

## 2025-07-20 PROCEDURE — 81003 URINALYSIS AUTO W/O SCOPE: CPT | Performed by: PHYSICIAN ASSISTANT

## 2025-07-20 PROCEDURE — 71046 X-RAY EXAM CHEST 2 VIEWS: CPT

## 2025-07-20 PROCEDURE — 85300 ANTITHROMBIN III ACTIVITY: CPT | Performed by: PHYSICIAN ASSISTANT

## 2025-07-20 PROCEDURE — 83690 ASSAY OF LIPASE: CPT | Performed by: PHYSICIAN ASSISTANT

## 2025-07-20 PROCEDURE — 2500000004 HC RX 250 GENERAL PHARMACY W/ HCPCS (ALT 636 FOR OP/ED): Mod: JZ | Performed by: PHYSICIAN ASSISTANT

## 2025-07-20 PROCEDURE — 2550000001 HC RX 255 CONTRASTS: Performed by: PHYSICIAN ASSISTANT

## 2025-07-20 PROCEDURE — 99285 EMERGENCY DEPT VISIT HI MDM: CPT | Mod: 25

## 2025-07-20 PROCEDURE — 80053 COMPREHEN METABOLIC PANEL: CPT | Performed by: PHYSICIAN ASSISTANT

## 2025-07-20 PROCEDURE — 71046 X-RAY EXAM CHEST 2 VIEWS: CPT | Mod: FOREIGN READ | Performed by: RADIOLOGY

## 2025-07-20 PROCEDURE — 71275 CT ANGIOGRAPHY CHEST: CPT | Mod: FOREIGN READ | Performed by: RADIOLOGY

## 2025-07-20 PROCEDURE — 81025 URINE PREGNANCY TEST: CPT | Performed by: PHYSICIAN ASSISTANT

## 2025-07-20 RX ORDER — FAMOTIDINE 10 MG/ML
20 INJECTION, SOLUTION INTRAVENOUS ONCE
Status: COMPLETED | OUTPATIENT
Start: 2025-07-20 | End: 2025-07-20

## 2025-07-20 RX ORDER — KETOROLAC TROMETHAMINE 15 MG/ML
15 INJECTION, SOLUTION INTRAMUSCULAR; INTRAVENOUS ONCE
Status: COMPLETED | OUTPATIENT
Start: 2025-07-20 | End: 2025-07-20

## 2025-07-20 RX ORDER — FAMOTIDINE 20 MG/1
20 TABLET, FILM COATED ORAL 2 TIMES DAILY
Qty: 30 TABLET | Refills: 0 | Status: SHIPPED | OUTPATIENT
Start: 2025-07-20 | End: 2025-08-04

## 2025-07-20 RX ORDER — NAPROXEN 500 MG/1
500 TABLET ORAL
Qty: 14 TABLET | Refills: 0 | Status: SHIPPED | OUTPATIENT
Start: 2025-07-20 | End: 2025-07-27

## 2025-07-20 RX ADMIN — IOHEXOL 75 ML: 350 INJECTION, SOLUTION INTRAVENOUS at 17:14

## 2025-07-20 RX ADMIN — KETOROLAC TROMETHAMINE 15 MG: 15 INJECTION, SOLUTION INTRAMUSCULAR; INTRAVENOUS at 17:22

## 2025-07-20 RX ADMIN — FAMOTIDINE 20 MG: 10 INJECTION, SOLUTION INTRAVENOUS at 17:23

## 2025-07-20 ASSESSMENT — LIFESTYLE VARIABLES
HAVE PEOPLE ANNOYED YOU BY CRITICIZING YOUR DRINKING: NO
HAVE YOU EVER FELT YOU SHOULD CUT DOWN ON YOUR DRINKING: NO
TOTAL SCORE: 0
EVER HAD A DRINK FIRST THING IN THE MORNING TO STEADY YOUR NERVES TO GET RID OF A HANGOVER: NO
EVER FELT BAD OR GUILTY ABOUT YOUR DRINKING: NO

## 2025-07-20 ASSESSMENT — PAIN SCALES - GENERAL
PAINLEVEL_OUTOF10: 0 - NO PAIN
PAINLEVEL_OUTOF10: 0 - NO PAIN
PAINLEVEL_OUTOF10: 4

## 2025-07-20 ASSESSMENT — PAIN DESCRIPTION - PAIN TYPE: TYPE: ACUTE PAIN

## 2025-07-20 ASSESSMENT — PAIN - FUNCTIONAL ASSESSMENT
PAIN_FUNCTIONAL_ASSESSMENT: 0-10

## 2025-07-20 ASSESSMENT — PAIN DESCRIPTION - LOCATION: LOCATION: CHEST

## 2025-07-20 ASSESSMENT — PAIN DESCRIPTION - PROGRESSION: CLINICAL_PROGRESSION: RESOLVED

## 2025-07-20 NOTE — ED PROVIDER NOTES
HPI   Chief Complaint   Patient presents with    Chest Pain       35-year-old female presented emergency department with a chief complaint of pain in the right side of her chest.  Started today.  She states she is a family history of pulmonary embolism, no personal history of pulmonary embolism.  States she has a history of factor V Leiden.  She states that she was recommended not to be on an anticoagulant.  Pain seems worse with movement.  Not affected by eating or drinking or deep breathing.  Well-appearing nontoxic afebrile.  Denies leg swelling.  Denies injury or trauma.  No other complaint.              Patient History   Medical History[1]  Surgical History[2]  Family History[3]  Social History[4]    Physical Exam   ED Triage Vitals [07/20/25 1449]   Temp Heart Rate Respirations BP   -- 69 16 (!) 125/97      Pulse Ox Temp src Heart Rate Source Patient Position   100 % -- Radial Sitting      BP Location FiO2 (%)     Right arm --       Physical Exam  Vitals and nursing note reviewed.   Constitutional:       Appearance: She is well-developed.   HENT:      Head: Normocephalic.     Cardiovascular:      Rate and Rhythm: Normal rate and regular rhythm.      Heart sounds: Normal heart sounds.   Pulmonary:      Effort: Pulmonary effort is normal.      Breath sounds: Normal breath sounds.     Musculoskeletal:         General: Normal range of motion.      Cervical back: Normal range of motion.      Right lower leg: No edema.      Left lower leg: No edema.     Skin:     General: Skin is warm.     Neurological:      General: No focal deficit present.      Mental Status: She is alert and oriented to person, place, and time.     Psychiatric:         Mood and Affect: Mood normal.           ED Course & MDM   ED Course as of 07/20/25 1824   Sun Jul 20, 2025   1718 EKG interpreted by me: Normal sinus rhythm, rate 61.  Normal axis.  No significant ST or T wave abnormalities. [ML]      ED Course User Index  [ML] Som So MD          Diagnoses as of 07/20/25 1824   Chest pain, unspecified type                 No data recorded     [place] Coma Scale Score: 15 (07/20/25 1450 : Raine Novak RN)                           Medical Decision Making  I have seen and evaluated this patient.  Physician available for consultation.  Vital signs have been reviewed.  All laboratory and diagnostic imaging is reviewed by myself and interpreted by myself unless otherwise stated.  Additionally imaging is interpreted by radiologist.    CBC without significant leukocytosis or anemia, metabolic panel without significant renal impairment or electrolyte abnormality.  D-dimer marginally elevated, sent for CT angiogram, CT angiogram without evidence of pulmonary embolism or aortic abnormality or acute cardiopulmonary process.  Troponins are negative.  Most consistent with musculoskeletal pain.  Given outpatient cardiology referral.  Placed on course of NSAID.  Released in stable condition from emergent standpoint.          Labs Reviewed   D-DIMER, NON VTE - Abnormal       Result Value    D-Dimer Non VTE, Quant (mg/L FEU) 0.59 (*)     Narrative:     THROMBOEMBOLIC EVENTS CANNOT BE EXCLUDED SOLELY ON THE BASIS OF THE D-DIMER LEVEL BEING WITHIN THE NORMAL REFERENCE RANGE. D-DIMER LEVELS LESS THAN 0.5 MG/L FEU IN CONJUNCTION WITH A LOW CLINICAL PROBABILITY HAVE AN EXCELLENT NEGATIVE PREDICTIVE VALUE IN EXCLUDING A DIAGNOSIS OF PULMONARY EMBOLUS (PE) OR DEEP VEIN THROMBOSIS (DVT). ELEVATED D-DIMER LEVELS ARE NOT SPECIFIC TO PE OR DVT, AND MAY BE SEEN IN PATIENTS WITH DIC, ADVANCED AGE, PREGNANCY, MALIGNANCY, LIVER DISEASE, INFECTION, AND INFLAMMATORY CONDITIONS AMONG OTHERS. D-DIMER LEVELS MAY BE DECREASED IN PATIENTS RECEIVING ANTI-COAGULATION THERAPY.   COMPREHENSIVE METABOLIC PANEL - Normal    Glucose 85      Sodium 137      Potassium 3.6      Chloride 106      Bicarbonate 24      Anion Gap 11      Urea Nitrogen 12      Creatinine 0.69      eGFR >90      Calcium  9.3      Albumin 4.4      Alkaline Phosphatase 89      Total Protein 7.6      AST 20      Bilirubin, Total 0.8      ALT 15     LIPASE - Normal    Lipase 54      Narrative:     Venipuncture immediately after or during the administration of Metamizole may lead to falsely low results. Testing should be performed immediately prior to Metamizole dosing.   HCG, URINE, QUALITATIVE - Normal    HCG, Urine NEGATIVE     SERIAL TROPONIN-INITIAL - Normal    Troponin I, High Sensitivity 5      Narrative:     Less than 99th percentile of normal range cutoff-  Female and children under 18 years old <14 ng/L; Male <21 ng/L: Negative  Repeat testing should be performed if clinically indicated.     Female and children under 18 years old 14-50 ng/L; Male 21-50 ng/L:  Consistent with possible cardiac damage and possible increased clinical   risk. Serial measurements may help to assess extent of myocardial damage.     >50 ng/L: Consistent with cardiac damage, increased clinical risk and  myocardial infarction. Serial measurements may help assess extent of   myocardial damage.      NOTE: Children less than 1 year old may have higher baseline troponin   levels and results should be interpreted in conjunction with the overall   clinical context.     NOTE: Troponin I testing is performed using a different   testing methodology at [facility] than at other   [facility]. Direct result comparisons should only   be made within the same method.   URINALYSIS WITH REFLEX CULTURE AND MICROSCOPIC - Normal    Color, Urine Light-Yellow      Appearance, Urine Clear      Specific Gravity, Urine 1.025      pH, Urine 6.5      Protein, Urine NEGATIVE      Glucose, Urine Normal      Blood, Urine NEGATIVE      Ketones, Urine NEGATIVE      Bilirubin, Urine NEGATIVE      Urobilinogen, Urine Normal      Nitrite, Urine NEGATIVE      Leukocyte Esterase, Urine NEGATIVE     SERIAL TROPONIN, 1 HOUR - Normal    Troponin I, High Sensitivity 4       Narrative:     Less than 99th percentile of normal range cutoff-  Female and children under 18 years old <14 ng/L; Male <21 ng/L: Negative  Repeat testing should be performed if clinically indicated.     Female and children under 18 years old 14-50 ng/L; Male 21-50 ng/L:  Consistent with possible cardiac damage and possible increased clinical   risk. Serial measurements may help to assess extent of myocardial damage.     >50 ng/L: Consistent with cardiac damage, increased clinical risk and  myocardial infarction. Serial measurements may help assess extent of   myocardial damage.      NOTE: Children less than 1 year old may have higher baseline troponin   levels and results should be interpreted in conjunction with the overall   clinical context.     NOTE: Troponin I testing is performed using a different   testing methodology at [facility] than at other   [facility]. Direct result comparisons should only   be made within the same method.   CBC WITH AUTO DIFFERENTIAL    WBC 7.1      nRBC 0.0      RBC 4.57      Hemoglobin 13.8      Hematocrit 39.7      MCV 87      MCH 30.2      MCHC 34.8      RDW 12.6      Platelets 198      Neutrophils % 62.2      Immature Granulocytes %, Automated 0.4      Lymphocytes % 32.8      Monocytes % 3.8      Eosinophils % 0.4      Basophils % 0.4      Neutrophils Absolute 4.38      Immature Granulocytes Absolute, Automated 0.03      Lymphocytes Absolute 2.31      Monocytes Absolute 0.27      Eosinophils Absolute 0.03      Basophils Absolute 0.03     TROPONIN SERIES- (INITIAL, 1 HR)    Narrative:     The following orders were created for panel order Troponin I Series, High Sensitivity (0, 1 HR).  Procedure                               Abnormality         Status                     ---------                               -----------         ------                     Troponin I, High Sensiti...[[id number]]  Normal              Final result               Troponin, High  Sensitivi...[[id number]]  Normal              Final result                 Please view results for these tests on the individual orders.   URINALYSIS WITH REFLEX CULTURE AND MICROSCOPIC    Narrative:     The following orders were created for panel order Urinalysis with Reflex Culture and Microscopic.  Procedure                               Abnormality         Status                     ---------                               -----------         ------                     Urinalysis with Reflex C...[[id number]]  Normal              Final result               Extra Urine Gray Tube[[id number]]                            In process                   Please view results for these tests on the individual orders.   EXTRA URINE GRAY TUBE     CT angio chest for pulmonary embolism   Final Result   No definite acute thoracic pathology identified.  No evidence of large   central pulmonary embolus.   Hepatic steatosis.   Borderline splenomegaly.   Signed by Tank Marie      XR chest 2 views   Final Result   No acute pulmonary pathology.   Signed by Adebayo Bro MD        Medications   famotidine PF (Pepcid) injection 20 mg (20 mg intravenous Given 7/20/25 1723)   ketorolac (Toradol) injection 15 mg (15 mg intravenous Given 7/20/25 1722)   iohexol (OMNIPaque) 350 mg iodine/mL solution 75 mL (75 mL intravenous Given 7/20/25 1714)     New Prescriptions    FAMOTIDINE (PEPCID) 20 MG TABLET    Take 1 tablet (20 mg) by mouth 2 times a day for 15 days.    NAPROXEN (NAPROSYN) 500 MG TABLET    Take 1 tablet (500 mg) by mouth 2 times daily (morning and late afternoon) for 7 days.         Procedure  Procedures       [1]   Past Medical History:  Diagnosis Date    Anxiety May 2022    On zoloft 100mg    Arthritis 2009    Rheumatoid arthritis; enbrel injections 50mg weekly    Autoimmune disorder (Multi) 2009    Rheumatoid arthritis    Chronic pain disorder 2009    Rheumatoid arthritis    Depression May 2022    On zoloft 100mg    Factor V  Susanne (Multi)     Not on routine meds; usually get placed on lovenox after procedures    Gestational diabetes 2021    Resolved after birth    Immunocompromised     Rheumatoid arthritis    Intellectual disability May 2022    On zoloft 100mg daily    Joint pain     Rheumatoid arthritis    Obesity Unsure    Thrombocytopenia May 2021    Resolved; was pregnant   [2]   Past Surgical History:  Procedure Laterality Date    ADENOIDECTOMY  At 2 years old     SECTION, LOW TRANSVERSE  2021    COLPOSCOPY  At 22 years old    Came back fine    OTHER SURGICAL HISTORY  2009 and 2009; L ankle stabilization 2016; L ankle arthroscopy    TONSILLECTOMY  2 years old    TYMPANOPLASTY  2 years old   [3]   Family History  Problem Relation Name Age of Onset    Hypertension Mother Angi Grullon     Cancer Father Tiago grullon sr-small cell lung cancer     Arthritis Maternal Grandfather Torrescampbell chaner     Stroke Maternal Grandmother Liliana jaquelin     Depression Brother Tiago Grullon    [4]   Social History  Tobacco Use    Smoking status: Former     Current packs/day: 0.00     Types: Cigarettes     Quit date: 2015     Years since quittin.9    Smokeless tobacco: Never    Tobacco comments:     Former smoker socially 1 pack every couple weeks   Vaping Use    Vaping status: Never Used   Substance Use Topics    Alcohol use: Yes     Alcohol/week: 1.0 standard drink of alcohol     Types: 1 Standard drinks or equivalent per week     Comment: Occasionally on holidays/special occasions    Drug use: Never        Adán Ny PA-C  25 1821

## 2025-07-20 NOTE — ED TRIAGE NOTES
Arrived to ER via EMS from home for c/o CP started 2 hours PTA. Pt points to epigastric area. Denies dizziness/SOB. No meds given by EMS

## 2025-08-12 ENCOUNTER — DOCUMENTATION (OUTPATIENT)
Dept: PHYSICAL THERAPY | Facility: CLINIC | Age: 35
End: 2025-08-12
Payer: COMMERCIAL

## (undated) DEVICE — PENCIL, ELECTROSURG, W/BUTTON SWITCH & HOLSTER, EZ CLEAN, DISP

## (undated) DEVICE — GUIDEWIRE, ASNIS III, 3.2 X 300MM, THREADED

## (undated) DEVICE — BUR, OVAL, LONG, 4.0

## (undated) DEVICE — Device

## (undated) DEVICE — SUTURE, ETHILON, 3-0, 18 IN, PS1, BLACK

## (undated) DEVICE — DRESSING, PREVENA, PEEL AND PLACE, 13CM

## (undated) DEVICE — DRAPE COVER, C ARM, FLOUROSCAN IMAGING SYS

## (undated) DEVICE — SUTURE, VICRYL, 2-0, 27 IN, SH, UNDYED

## (undated) DEVICE — DRESSING, NON-ADHERENT, 3 X 3 IN, STERILE

## (undated) DEVICE — BLANKET, LOWER BODY, VHA PLUS, ADULT

## (undated) DEVICE — BANDAGE, ESMARK, 4 IN X 12 FT, LF

## (undated) DEVICE — GOWN, ASTOUND, XL

## (undated) DEVICE — PADDING, UNDERCAST, WEBRIL, 4 IN X 4 YD, REG, NS

## (undated) DEVICE — SUTURE, VICRYL, 3-0, 27 IN, SH, VIOLET

## (undated) DEVICE — BANDAGE, GAUZE, COTTON, STERILE, BULKEE II, 4.5IN X 4.1YD

## (undated) DEVICE — BLADE, PRECISION THICK, MED, LONG, 9 X 0.51 X 31MM

## (undated) DEVICE — SOLUTION, IRRIGATION, X RX SODIUM CHL 0.9%, 1000ML BTL

## (undated) DEVICE — SPONGE, GAUZE, AVANT, STERILE, NONWOVEN, 4PLY, 4 X 4, STANDARD